# Patient Record
Sex: MALE | Race: WHITE | Employment: OTHER | URBAN - METROPOLITAN AREA
[De-identification: names, ages, dates, MRNs, and addresses within clinical notes are randomized per-mention and may not be internally consistent; named-entity substitution may affect disease eponyms.]

---

## 2021-03-23 ENCOUNTER — APPOINTMENT (OUTPATIENT)
Dept: CT IMAGING | Age: 71
DRG: 167 | End: 2021-03-23
Payer: MEDICARE

## 2021-03-23 ENCOUNTER — HOSPITAL ENCOUNTER (INPATIENT)
Age: 71
LOS: 2 days | Discharge: HOME | DRG: 167 | End: 2021-03-25
Attending: EMERGENCY MEDICINE | Admitting: INTERNAL MEDICINE
Payer: MEDICARE

## 2021-03-23 ENCOUNTER — APPOINTMENT (OUTPATIENT)
Dept: GENERAL RADIOLOGY | Age: 71
DRG: 167 | End: 2021-03-23
Payer: MEDICARE

## 2021-03-23 DIAGNOSIS — I82.412 ACUTE DEEP VEIN THROMBOSIS (DVT) OF FEMORAL VEIN OF LEFT LOWER EXTREMITY (HCC): ICD-10-CM

## 2021-03-23 DIAGNOSIS — Z99.81 HYPOXEMIA REQUIRING SUPPLEMENTAL OXYGEN: ICD-10-CM

## 2021-03-23 DIAGNOSIS — R06.09 DYSPNEA ON EXERTION: ICD-10-CM

## 2021-03-23 DIAGNOSIS — R55 NEAR SYNCOPE: ICD-10-CM

## 2021-03-23 DIAGNOSIS — I26.99 BILATERAL PULMONARY EMBOLISM (HCC): Primary | ICD-10-CM

## 2021-03-23 DIAGNOSIS — R09.02 HYPOXEMIA REQUIRING SUPPLEMENTAL OXYGEN: ICD-10-CM

## 2021-03-23 DIAGNOSIS — I26.99 ACUTE MASSIVE PULMONARY EMBOLISM (HCC): ICD-10-CM

## 2021-03-23 LAB
A/G RATIO: 1.5 (ref 1.1–2.2)
ABO/RH: NORMAL
ALBUMIN SERPL-MCNC: 4.1 G/DL (ref 3.4–5)
ALP BLD-CCNC: 58 U/L (ref 40–129)
ALT SERPL-CCNC: 10 U/L (ref 10–40)
ANION GAP SERPL CALCULATED.3IONS-SCNC: 13 MMOL/L (ref 3–16)
ANTIBODY SCREEN: NORMAL
APTT: 28.9 SEC (ref 24.2–36.2)
AST SERPL-CCNC: 20 U/L (ref 15–37)
BASE EXCESS VENOUS: -4.4 MMOL/L (ref -3–3)
BASOPHILS ABSOLUTE: 0.1 K/UL (ref 0–0.2)
BASOPHILS RELATIVE PERCENT: 0.6 %
BILIRUB SERPL-MCNC: 0.4 MG/DL (ref 0–1)
BUN BLDV-MCNC: 18 MG/DL (ref 7–20)
CALCIUM SERPL-MCNC: 9.8 MG/DL (ref 8.3–10.6)
CARBOXYHEMOGLOBIN: 1.7 % (ref 0–1.5)
CHLORIDE BLD-SCNC: 102 MMOL/L (ref 99–110)
CO2: 24 MMOL/L (ref 21–32)
CREAT SERPL-MCNC: 1.1 MG/DL (ref 0.8–1.3)
EOSINOPHILS ABSOLUTE: 0.2 K/UL (ref 0–0.6)
EOSINOPHILS RELATIVE PERCENT: 1.4 %
GFR AFRICAN AMERICAN: >60
GFR NON-AFRICAN AMERICAN: >60
GLOBULIN: 2.8 G/DL
GLUCOSE BLD-MCNC: 174 MG/DL (ref 70–99)
HCO3 VENOUS: 23.1 MMOL/L (ref 23–29)
HCT VFR BLD CALC: 41.3 % (ref 40.5–52.5)
HEMOGLOBIN: 13.7 G/DL (ref 13.5–17.5)
LYMPHOCYTES ABSOLUTE: 2.2 K/UL (ref 1–5.1)
LYMPHOCYTES RELATIVE PERCENT: 17 %
MCH RBC QN AUTO: 32.1 PG (ref 26–34)
MCHC RBC AUTO-ENTMCNC: 33.1 G/DL (ref 31–36)
MCV RBC AUTO: 97 FL (ref 80–100)
METHEMOGLOBIN VENOUS: 0.4 %
MONOCYTES ABSOLUTE: 0.9 K/UL (ref 0–1.3)
MONOCYTES RELATIVE PERCENT: 6.9 %
NEUTROPHILS ABSOLUTE: 9.6 K/UL (ref 1.7–7.7)
NEUTROPHILS RELATIVE PERCENT: 74.1 %
O2 CONTENT, VEN: 11 VOL %
O2 SAT, VEN: 53 %
O2 THERAPY: ABNORMAL
PCO2, VEN: 51.8 MMHG (ref 40–50)
PDW BLD-RTO: 13.5 % (ref 12.4–15.4)
PH VENOUS: 7.27 (ref 7.35–7.45)
PLATELET # BLD: 164 K/UL (ref 135–450)
PMV BLD AUTO: 9.7 FL (ref 5–10.5)
PO2, VEN: 30.2 MMHG (ref 25–40)
POTASSIUM REFLEX MAGNESIUM: 3.6 MMOL/L (ref 3.5–5.1)
PRO-BNP: 85 PG/ML (ref 0–124)
RBC # BLD: 4.26 M/UL (ref 4.2–5.9)
SARS-COV-2, NAAT: NOT DETECTED
SODIUM BLD-SCNC: 139 MMOL/L (ref 136–145)
TCO2 CALC VENOUS: 25 MMOL/L
TOTAL PROTEIN: 6.9 G/DL (ref 6.4–8.2)
TROPONIN: 0.08 NG/ML
WBC # BLD: 13 K/UL (ref 4–11)

## 2021-03-23 PROCEDURE — 86901 BLOOD TYPING SEROLOGIC RH(D): CPT

## 2021-03-23 PROCEDURE — 82803 BLOOD GASES ANY COMBINATION: CPT

## 2021-03-23 PROCEDURE — 86900 BLOOD TYPING SEROLOGIC ABO: CPT

## 2021-03-23 PROCEDURE — 85730 THROMBOPLASTIN TIME PARTIAL: CPT

## 2021-03-23 PROCEDURE — 71260 CT THORAX DX C+: CPT

## 2021-03-23 PROCEDURE — 86850 RBC ANTIBODY SCREEN: CPT

## 2021-03-23 PROCEDURE — 2700000000 HC OXYGEN THERAPY PER DAY

## 2021-03-23 PROCEDURE — 02FR3Z0 FRAGMENTATION OF LEFT PULMONARY ARTERY, PERCUTANEOUS APPROACH, ULTRASONIC: ICD-10-PCS | Performed by: SURGERY

## 2021-03-23 PROCEDURE — 99284 EMERGENCY DEPT VISIT MOD MDM: CPT

## 2021-03-23 PROCEDURE — 71045 X-RAY EXAM CHEST 1 VIEW: CPT

## 2021-03-23 PROCEDURE — 6360000004 HC RX CONTRAST MEDICATION: Performed by: NURSE PRACTITIONER

## 2021-03-23 PROCEDURE — 93005 ELECTROCARDIOGRAM TRACING: CPT | Performed by: NURSE PRACTITIONER

## 2021-03-23 PROCEDURE — 2500000003 HC RX 250 WO HCPCS: Performed by: NURSE PRACTITIONER

## 2021-03-23 PROCEDURE — 2000000000 HC ICU R&B

## 2021-03-23 PROCEDURE — 6360000002 HC RX W HCPCS

## 2021-03-23 PROCEDURE — 2580000003 HC RX 258: Performed by: NURSE PRACTITIONER

## 2021-03-23 PROCEDURE — 36415 COLL VENOUS BLD VENIPUNCTURE: CPT

## 2021-03-23 PROCEDURE — 80053 COMPREHEN METABOLIC PANEL: CPT

## 2021-03-23 PROCEDURE — 94660 CPAP INITIATION&MGMT: CPT

## 2021-03-23 PROCEDURE — 94761 N-INVAS EAR/PLS OXIMETRY MLT: CPT

## 2021-03-23 PROCEDURE — 02FQ3Z0 FRAGMENTATION OF RIGHT PULMONARY ARTERY, PERCUTANEOUS APPROACH, ULTRASONIC: ICD-10-PCS | Performed by: SURGERY

## 2021-03-23 PROCEDURE — 83880 ASSAY OF NATRIURETIC PEPTIDE: CPT

## 2021-03-23 PROCEDURE — 70450 CT HEAD/BRAIN W/O DYE: CPT

## 2021-03-23 PROCEDURE — 87635 SARS-COV-2 COVID-19 AMP PRB: CPT

## 2021-03-23 PROCEDURE — 84484 ASSAY OF TROPONIN QUANT: CPT

## 2021-03-23 PROCEDURE — 96374 THER/PROPH/DIAG INJ IV PUSH: CPT

## 2021-03-23 PROCEDURE — 96375 TX/PRO/DX INJ NEW DRUG ADDON: CPT

## 2021-03-23 PROCEDURE — 6360000002 HC RX W HCPCS: Performed by: NURSE PRACTITIONER

## 2021-03-23 PROCEDURE — 74177 CT ABD & PELVIS W/CONTRAST: CPT

## 2021-03-23 PROCEDURE — 85025 COMPLETE CBC W/AUTO DIFF WBC: CPT

## 2021-03-23 PROCEDURE — 3E04317 INTRODUCTION OF OTHER THROMBOLYTIC INTO CENTRAL VEIN, PERCUTANEOUS APPROACH: ICD-10-PCS | Performed by: SURGERY

## 2021-03-23 RX ORDER — HEPARIN SODIUM 1000 [USP'U]/ML
80 INJECTION, SOLUTION INTRAVENOUS; SUBCUTANEOUS ONCE
Status: COMPLETED | OUTPATIENT
Start: 2021-03-23 | End: 2021-03-23

## 2021-03-23 RX ORDER — ONDANSETRON 2 MG/ML
INJECTION INTRAMUSCULAR; INTRAVENOUS
Status: COMPLETED
Start: 2021-03-23 | End: 2021-03-23

## 2021-03-23 RX ORDER — 0.9 % SODIUM CHLORIDE 0.9 %
1000 INTRAVENOUS SOLUTION INTRAVENOUS ONCE
Status: COMPLETED | OUTPATIENT
Start: 2021-03-23 | End: 2021-03-23

## 2021-03-23 RX ORDER — METOCLOPRAMIDE HYDROCHLORIDE 5 MG/ML
10 INJECTION INTRAMUSCULAR; INTRAVENOUS ONCE
Status: COMPLETED | OUTPATIENT
Start: 2021-03-23 | End: 2021-03-23

## 2021-03-23 RX ORDER — HEPARIN SODIUM 1000 [USP'U]/ML
80 INJECTION, SOLUTION INTRAVENOUS; SUBCUTANEOUS PRN
Status: DISCONTINUED | OUTPATIENT
Start: 2021-03-17 | End: 2021-03-25

## 2021-03-23 RX ORDER — ONDANSETRON 2 MG/ML
4 INJECTION INTRAMUSCULAR; INTRAVENOUS ONCE
Status: COMPLETED | OUTPATIENT
Start: 2021-03-23 | End: 2021-03-23

## 2021-03-23 RX ORDER — HEPARIN SODIUM 10000 [USP'U]/100ML
18 INJECTION, SOLUTION INTRAVENOUS CONTINUOUS
Status: DISCONTINUED | OUTPATIENT
Start: 2021-03-23 | End: 2021-03-24

## 2021-03-23 RX ORDER — HEPARIN SODIUM 1000 [USP'U]/ML
40 INJECTION, SOLUTION INTRAVENOUS; SUBCUTANEOUS PRN
Status: DISCONTINUED | OUTPATIENT
Start: 2021-03-24 | End: 2021-03-25

## 2021-03-23 RX ADMIN — IOPAMIDOL 75 ML: 755 INJECTION, SOLUTION INTRAVENOUS at 20:51

## 2021-03-23 RX ADMIN — METOCLOPRAMIDE HYDROCHLORIDE 10 MG: 5 INJECTION INTRAMUSCULAR; INTRAVENOUS at 20:11

## 2021-03-23 RX ADMIN — ONDANSETRON 4 MG: 2 INJECTION INTRAMUSCULAR; INTRAVENOUS at 20:02

## 2021-03-23 RX ADMIN — SODIUM CHLORIDE 1000 ML: 9 INJECTION, SOLUTION INTRAVENOUS at 20:11

## 2021-03-23 RX ADMIN — HEPARIN SODIUM 6900 UNITS: 1000 INJECTION INTRAVENOUS; SUBCUTANEOUS at 21:56

## 2021-03-23 RX ADMIN — HEPARIN SODIUM 18 UNITS/KG/HR: 10000 INJECTION, SOLUTION INTRAVENOUS at 21:56

## 2021-03-23 ASSESSMENT — ENCOUNTER SYMPTOMS
NAUSEA: 0
ABDOMINAL PAIN: 0
SHORTNESS OF BREATH: 1
COLOR CHANGE: 0
WHEEZING: 0
DIARRHEA: 0
COUGH: 0
VOMITING: 0
BACK PAIN: 0

## 2021-03-23 NOTE — ED PROVIDER NOTES
I independently performed a history and physical on Miguel Austin. All diagnostic, treatment, and disposition decisions were made by myself in conjunction with the advanced practice provider.     -Mansoor Haines is a 79 y.o. male presents to ED for shortness of breath. Patient states he was walking in his barn when he felt acute shortness of breath and he had to sit down. This happened 1 more time before he was able to make it back to his house. He states that he did not want his wife putting away the horses so he went back to the barn and as he was walking back to the house, felt short of breath again and passed out. He was nauseous and had several episodes of emesis on route with EMS. He was given nausea medication at the time. Denies chest pain, fever, cough. Received his second covid vaccination today.   -Upon arrival patient was on a nonrebreather, afebrile, tachycardic at 105 bpm with oxygen saturation of 83%. He was transitioned to BiPAP, however had to take the BiPAP mask off due to nausea. After taking of the mass, he had several episodes of emesis and was given second dose of Zofran. He was put on nasal cannula however his oxygen only increased to mid 80s and therefore was transitioned to nonrebreather with improvement in the low 90s. -PE: Pale, tachycardic, tachypneic, CTAB/l, no w/r/r, abdomen soft, ND, NTTP, + BS x 4, no rigidity, rebound, guarding  -lab workup significant for: Elevated troponin of 0.08, leukocytosis of 13. VBG shows acidosis with pH of 7.26, increased CO2 of 51.  -Chest x-ray shows minimal left basilar atelectasis or airspace disease. -CT chest abdomen pelvis shows extensive bilateral pulmonary emboli with CT evidence suggestive of right heart strain. No acute intra-abdominal abnormality. Diverticulosis. Cholelithiasis. Partially calcified pulmonary nodule in the right upper lobe is most suggestive of granulomatous disease.   -CT head: No acute intracranial abnormality  -The Ekg interpreted by me shows  sinus tachycardia, swyi=[id number]   Axis is   Normal  QTc is  444  Intervals and Durations are unremarkable. ST Segments: depression in  lateral leads  No prior ekg for comparison   -Patient was started on high-dose heparin.  -Plan for admission for further workup and treatment for respiratory failure secondary to PE discussed with patient who is in agreement with plan and have no further questions/concerns  CRITICAL CARE NOTE:  Upon my evaluation, this patient had a high probability of imminent or life-threatening deterioration due to respiratory failure which required my direct attention, intervention, and personal management. I have personally provided 35 minutes of critical care time exclusive of time spent on separately billable procedures. Time includes review of laboratory data, analysis/ordering/implementation of complex plan, review of radiology results, discussion with consultants, discussion with family, and monitoring for potential decompensation. Interventions were performed as documented above. For further details of Sonali Austin's emergency department encounter, please see [facility]'s documentation.         Gudelia Barragan MD  03/23/21 4940

## 2021-03-23 NOTE — ED NOTES
Bed: 04  Expected date:   Expected time:   Means of arrival:   Comments:  Albert Dumont RN  03/23/21 1936

## 2021-03-23 NOTE — ED PROVIDER NOTES
**ADVANCED PRACTICE PROVIDER, I HAVE EVALUATED THIS [facility]  ED  EMERGENCY DEPARTMENT ENCOUNTER      Pt Name: Gregg Au  CDK:[id number]  [place] 1950  Date of evaluation: 3/23/2021  Provider: DEV Banks CNP      Chief Complaint:    Chief Complaint   Patient presents with    Shortness of Breath     Patient comes into ED via [facility] with c/o sudden onset of shortness of breath and vomiting  and had second round of COVID vaccine today. EMS states upon arrival his lips were blue in collar and placed on nonrebreather. Patient is alert and oriented upon arrival to ED and denies any significant medical history. Nursing Notes, Past Medical Hx, Past Surgical Hx, Social Hx, Allergies, and Family Hx were all reviewed and agreed with or any disagreements were addressed in the HPI.    HPI:  (Location, Duration, Timing, Severity, Quality, Assoc Sx, Context, Modifying factors)  This is a  79 y.o. male who presents to the emergency department from EMS with sudden onset of shortness of breath, patient states that just prior to ED arrival he was walking to his worst morning and all of a sudden he became short of breath. States that the came on suddenly, he did have a second Covid vaccine, states that he became very weak, went to the ground, 911 was called. Patient states he just feels like he cannot catch his breath. He denies any cough, congestion, chest pain pleuritic chest pain associated with the shortness of breath. When EMS arrived they states his lips were blue, placed on nonrebreather mask as his pulse ox was in the mid 70s. He has no history of chronic lung disease, no history of really any medical problems, has not really seen a physician. He denies any headache neck pain or neck stiffness. He reports nausea, has vomited 4 times in route with EMS.   He denies any alcohol or drug use, no new medications, no additional aggravating relieving factors, no pain on exam.  He presents awake, alert, pale, clammy, short of breath and hypoxic. PastMedical/Surgical History:  History reviewed. No pertinent past medical history. History reviewed. No pertinent surgical history. Medications:  Previous Medications    No medications on file         Review of Systems:  Review of Systems   Constitutional: Negative for chills and fever. HENT: Negative for congestion. Respiratory: Positive for shortness of breath. Negative for cough and wheezing. Patient complains of sudden onset of shortness of breath, patient states that just prior to ED arrival he was walking to his worst morning and all of a sudden he became short of breath. States that the came on suddenly, he did have a second Covid vaccine, states that he became very weak, went to the ground   Cardiovascular: Negative for chest pain. Denies any recent illnesses, fever, chills, cough or congestion, no chest pain or pleuritic chest pain associated with his shortness of breath. Gastrointestinal: Negative for abdominal pain, diarrhea, nausea and vomiting. Genitourinary: Negative for difficulty urinating, dysuria and frequency. Musculoskeletal: Negative for back pain. Skin: Negative for color change. Neurological: Negative for weakness, numbness and headaches. He states that he did get lightheaded, walking back to the barn. Denies any loss of consciousness, headache or dizziness on my exam     Positives and Pertinent negatives as per HPI. Except as noted above in the ROS, problem specific ROS was completed and is negative. Physical Exam:  Physical Exam  Vitals signs and nursing note reviewed. Constitutional:       Appearance: He is well-developed. He is not diaphoretic. HENT:      Head: Normocephalic. Right Ear: External ear normal.      Left Ear: External ear normal.   Eyes:      General: No scleral icterus. Right eye: No discharge.          Left eye: No discharge. Neck:      Musculoskeletal: Normal range of motion and neck supple. Cardiovascular:      Rate and Rhythm: Tachycardia present. Comments: S1 and 2, peripheral pulses are 2+, no edema observed, sinus tach on the monitor at 108 bpm. Bedside exam.  Pulmonary:      Effort: Pulmonary effort is normal. No respiratory distress. Comments: Airway patent with symmetric rise and fall of chest, clear anteriorly, diminished posteriorly, patient is slightly dyspneic during conversation however he is not really tachypneic, he is breathing 20 breaths/min however his pulse ox is only 86% on 100% nonrebreather. Abdominal:      Palpations: Abdomen is soft. Musculoskeletal: Normal range of motion. Skin:     General: Skin is warm. Capillary Refill: Capillary refill takes less than 2 seconds. Coloration: Skin is not pale. Neurological:      General: No focal deficit present. Mental Status: He is alert and oriented to person, place, and time. GCS: GCS eye subscore is 4. GCS verbal subscore is 5. GCS motor subscore is 6.    Psychiatric:         Behavior: Behavior normal.         MEDICAL DECISION MAKING    Vitals:    Vitals:    03/23/21 1949 03/23/21 2014 03/23/21 2120 03/23/21 2206   BP:  111/77 117/75 99/70   Pulse:  110 107 112   Resp: 14 15 17 16   Temp:       SpO2:  91% (!) 82% (!) 89%   Weight:       Height:           LABS:  Labs Reviewed   CBC WITH AUTO DIFFERENTIAL - Abnormal; Notable for the following components:       Result Value    WBC 13.0 (*)     Neutrophils Absolute 9.6 (*)     All other components within normal limits    Narrative:     Performed at:  [facility]) - [address]   Phone [phone]   COMPREHENSIVE METABOLIC PANEL W/ REFLEX TO MG FOR LOW K - Abnormal; Notable for the following components:    Glucose 174 (*)     All other components within normal limits    Narrative:     Performed at:  [facility]) - [address], [facility]   Phone [phone]   TROPONIN - Abnormal; Notable for the following components:    Troponin 0.08 (*)     All other components within normal limits    Narrative:     Performed at:  [place] [address] [facility]   Phone [phone]   BLOOD GAS, VENOUS - Abnormal; Notable for the following components:    pH, [place] 7.267 (*)     pCO2, [place] 51.8 (*)     Base Excess, [place] -4.4 (*)     Carboxyhemoglobin 1.7 (*)     All other components within normal limits    Narrative:     Performed at:  [place] [address] [facility]   Phone [phone]    Narrative:     Performed at:  [place] [address] [facility]   Phone [phone]   URINE RT REFLEX TO CULTURE   APTT   APTT   TYPE AND SCREEN    Narrative:     Performed at:  [address], [facility]   Phone  of labs reviewed and werenegative at this time or not returned at the time of this note. RADIOLOGY:   Non-plain film images such as CT, Ultrasound and MRI are read by the radiologist. Emelyn SEPULVEDA APRN - CNP have directly visualized the radiologic plain film image(s) with the below findings:        Interpretation per the Radiologist below, if available at the time of this note:    CT HEAD WO CONTRAST   Final Result   No acute intracranial abnormality. CT CHEST PULMONARY EMBOLISM W CONTRAST   Final Result   1. Extensive bilateral pulmonary emboli with CT evidence suggestive of right   heart strain. 2. No acute intra-abdominal abnormality. 3. Diverticulosis. 4. Cholelithiasis. 5. Partially calcified pulmonary nodule in the right upper lobe is most   suggestive of granulomatous disease.    Findings were discussed with Dr. Usha Anderson on 03/23/2021 at 9:20 p.m. Delsa Severance CT ABDOMEN PELVIS W IV CONTRAST Additional Contrast? None   Final Result   1. Extensive bilateral pulmonary emboli with CT evidence suggestive of right   heart strain. 2. No acute intra-abdominal abnormality. 3. Diverticulosis. 4. Cholelithiasis. 5. Partially calcified pulmonary nodule in the right upper lobe is most   suggestive of granulomatous disease. Findings were discussed with Dr. Usha Anderson on 03/23/2021 at 9:20 p.m. Delsa Severance XR CHEST PORTABLE   Final Result   Minimal left basilar atelectasis or airspace disease. 2.4 cm right perihilar nodule is likely partially calcified though is   incompletely characterized in this chest radiograph. Comparison with any   outside prior would be helpful to establish stability. Otherwise, in the   nonacute setting, chest CT would be recommended for more complete evaluation. MEDICAL DECISION MAKING / ED COURSE:    Because of high probability of sudden clinical deterioration of the patient's condition and risk of further deterioration, critical care time required my full attention to the patient's condition; which included chart data review, documentation, medication ordering, reviewing the patient's old records, reevaluation patient's cardiac, pulmonary and neurological status. Reevaluation of vital signs. Consultations with ED attending and admitting physician. Ordering, interpreting reviewing diagnostic testing. Therefore a critical care time was 60 minutes of direct attention to the patient's condition did not include time spent on procedures.     PROCEDURES:   Procedures    None    Patient was given:  Medications   heparin (porcine) injection 6,900 Units (has no administration in time range)   heparin (porcine) injection 3,450 Units (has no administration in time range)   heparin 25,000 unit in sodium chloride 0.45% 250 mL (premix) infusion (18 Units/kg/hr × 86.2 kg Intravenous New Bag 3/23/21 2156)   0.9 % sodium chloride bolus (0 mLs Intravenous Stopped 3/23/21 2208)   ondansetron (ZOFRAN) injection 4 mg (4 mg Intravenous Given 3/23/21 2002)   metoclopramide (REGLAN) injection 10 mg (10 mg Intravenous Given 3/23/21 2011)   iopamidol (ISOVUE-370) 76 % injection 75 mL (75 mLs Intravenous Given 3/23/21 2051)   heparin (porcine) injection 6,900 Units (6,900 Units Intravenous Given 3/23/21 2156)   patient presents via EMS with sudden onset of shortness of breath, patient states that just prior to ED arrival he was walking to his worst morning and all of a sudden he became short of breath. States that the came on suddenly, he did have a second Covid vaccine, states that he became very weak, went to the ground, 911 was called. patient is slightly dyspneic during conversation however he is not really tachypneic, he is breathing 20 breaths/min however his pulse ox is only 86% on 100% nonrebreather. After evaluation and examination the patient IV access, IV fluids, blood work, analysis, chest x-ray, EKG, CT scan of the chest abdomen pelvis were ordered. EKG shows sinus tachycardia with T wave inversion, please see Dr. Cristian Avalos EKG interpretation note. Patient had near syncopal episode, he was ordered a CT chest. CBC shows a leukocytosis with a white count 13,000, no acute anemia. Metabolic panel shows no electrolyte disturbances or renal insufficiency. Liver functions are normal. Troponin is 0.08. BNP is 85. Blood gas shows acidosis with a pH of 7.267, PCO2 of 51.8, carboxyhemoglobin is 1.7 otherwise the bicarb and PO2 are normal, CT the head shows no acute intracranial abnormality. At 2118 spoke with radiology on call, she informs me patient has multiple PEs with heart strain, patient was ordered high-dose heparin. Patient has been placed on high flow nasal cannula O2 at 60%, saturations are 88% with a high flow O2.  However he remains awake, alert and talking with the nursing staff and myself. However because of these acute findings I paged the hospitalist on-call via [email or web address] for admission. At 2220 I spoke with Dr. Pang Spearing face-to-face in the emergency department regards to the patient, he agreed to set the patient for admission, he is going to [facility] to discuss EKOS treatment. The patient tolerated their visit well. I evaluated the patient. The physician was available for consultation as needed. The patient and / or the family were informed of the results of any tests, a time was given to answer questions, a plan was proposed and they agreed with plan. However, patient will be admitted to hospital for further evaluation management of care. CLINICAL IMPRESSION:  1. Bilateral pulmonary embolism ([address]    2. Hypoxemia requiring supplemental oxygen    3. Near syncope    4. Dyspnea on exertion        DISPOSITION Decision To Admit 03/23/2021 10:06:59 PM      PATIENT REFERRED TO:  No follow-up provider specified. DISCHARGE MEDICATIONS:  New Prescriptions    No medications on file       DISCONTINUED MEDICATIONS:  Discontinued Medications    FAMOTIDINE (PEPCID) 40 MG TABLET    Take 1 tablet by mouth daily for 14 days.               (Please note the MDM and HPI sections of this note were completed with a voice recognition program.  Efforts were made to edit the dictations but occasionally words are mis-transcribed.)    Electronically signed, DEV Perez CNP,          DEV Perez CNP  03/23/21 9245

## 2021-03-23 NOTE — PROGRESS NOTES
03/23/21 1949   NIV Type   $NIV $Daily Charge   NIV Started/Stopped On   Equipment Type v60   Mode CPAP   Mask Type Full face mask   Mask Size Large   Settings/Measurements   CPAP/EPAP 13 cmH2O   Resp 14   FiO2  100 %   Vt Exhaled 875 mL   Minute Volume 12.2 Liters   Mask Leak (lpm) 130 lpm  (pt has beard )   Comfort Level Good   Using Accessory Muscles No   SpO2 94   Breath Sounds   Right Upper Lobe Clear   Right Middle Lobe Diminished   Right Lower Lobe Diminished   Left Upper Lobe Clear   Left Lower Lobe Diminished   Alarm Settings   Alarms On Y   Press Low Alarm 6 cmH2O   High Pressure Alarm 30 cmH2O   Delay Alarm 20 sec(s)   Resp Rate Low Alarm 6   High Respiratory Rate 40 br/min

## 2021-03-24 LAB
ALBUMIN SERPL-MCNC: 3.8 G/DL (ref 3.4–5)
ALP BLD-CCNC: 51 U/L (ref 40–129)
ALT SERPL-CCNC: 6 U/L (ref 10–40)
ANION GAP SERPL CALCULATED.3IONS-SCNC: 9 MMOL/L (ref 3–16)
APTT: 130.8 SEC (ref 24.2–36.2)
APTT: 155 SEC (ref 24.2–36.2)
APTT: 34.6 SEC (ref 24.2–36.2)
APTT: 44.2 SEC (ref 24.2–36.2)
APTT: 79.2 SEC (ref 24.2–36.2)
AST SERPL-CCNC: 22 U/L (ref 15–37)
BASOPHILS ABSOLUTE: 0 K/UL (ref 0–0.2)
BASOPHILS RELATIVE PERCENT: 0.3 %
BILIRUB SERPL-MCNC: 0.4 MG/DL (ref 0–1)
BILIRUBIN DIRECT: <0.2 MG/DL (ref 0–0.3)
BILIRUBIN URINE: ABNORMAL
BILIRUBIN, INDIRECT: ABNORMAL MG/DL (ref 0–1)
BLOOD, URINE: ABNORMAL
BUN BLDV-MCNC: 17 MG/DL (ref 7–20)
CALCIUM SERPL-MCNC: 9.1 MG/DL (ref 8.3–10.6)
CHLORIDE BLD-SCNC: 108 MMOL/L (ref 99–110)
CLARITY: CLEAR
CO2: 24 MMOL/L (ref 21–32)
COLOR: YELLOW
CREAT SERPL-MCNC: 0.9 MG/DL (ref 0.8–1.3)
EKG ATRIAL RATE: 104 BPM
EKG DIAGNOSIS: NORMAL
EKG P AXIS: 55 DEGREES
EKG P-R INTERVAL: 174 MS
EKG Q-T INTERVAL: 344 MS
EKG QRS DURATION: 126 MS
EKG QTC CALCULATION (BAZETT): 452 MS
EKG R AXIS: 60 DEGREES
EKG T AXIS: -64 DEGREES
EKG VENTRICULAR RATE: 104 BPM
EOSINOPHILS ABSOLUTE: 0 K/UL (ref 0–0.6)
EOSINOPHILS RELATIVE PERCENT: 0.1 %
EPITHELIAL CELLS, UA: NORMAL /HPF (ref 0–5)
FIBRINOGEN: 272 MG/DL (ref 200–397)
FIBRINOGEN: 311 MG/DL (ref 200–397)
FIBRINOGEN: 311 MG/DL (ref 200–397)
GFR AFRICAN AMERICAN: >60
GFR NON-AFRICAN AMERICAN: >60
GLUCOSE BLD-MCNC: 123 MG/DL (ref 70–99)
GLUCOSE URINE: NEGATIVE MG/DL
HCT VFR BLD CALC: 36.8 % (ref 40.5–52.5)
HCT VFR BLD CALC: 38.5 % (ref 40.5–52.5)
HCT VFR BLD CALC: 39.8 % (ref 40.5–52.5)
HEMOGLOBIN: 12.5 G/DL (ref 13.5–17.5)
HEMOGLOBIN: 13.1 G/DL (ref 13.5–17.5)
HEMOGLOBIN: 13.3 G/DL (ref 13.5–17.5)
KETONES, URINE: NEGATIVE MG/DL
LEUKOCYTE ESTERASE, URINE: NEGATIVE
LYMPHOCYTES ABSOLUTE: 1.4 K/UL (ref 1–5.1)
LYMPHOCYTES RELATIVE PERCENT: 13.2 %
MCH RBC QN AUTO: 32.4 PG (ref 26–34)
MCH RBC QN AUTO: 32.9 PG (ref 26–34)
MCH RBC QN AUTO: 33.3 PG (ref 26–34)
MCHC RBC AUTO-ENTMCNC: 33 G/DL (ref 31–36)
MCHC RBC AUTO-ENTMCNC: 34 G/DL (ref 31–36)
MCHC RBC AUTO-ENTMCNC: 34.6 G/DL (ref 31–36)
MCV RBC AUTO: 96.3 FL (ref 80–100)
MCV RBC AUTO: 96.8 FL (ref 80–100)
MCV RBC AUTO: 98.2 FL (ref 80–100)
MICROSCOPIC EXAMINATION: YES
MONOCYTES ABSOLUTE: 0.9 K/UL (ref 0–1.3)
MONOCYTES RELATIVE PERCENT: 8.1 %
NEUTROPHILS ABSOLUTE: 8.3 K/UL (ref 1.7–7.7)
NEUTROPHILS RELATIVE PERCENT: 78.3 %
NITRITE, URINE: NEGATIVE
PDW BLD-RTO: 13.4 % (ref 12.4–15.4)
PDW BLD-RTO: 13.8 % (ref 12.4–15.4)
PDW BLD-RTO: 13.8 % (ref 12.4–15.4)
PH UA: 5.5 (ref 5–8)
PLATELET # BLD: 122 K/UL (ref 135–450)
PLATELET # BLD: 137 K/UL (ref 135–450)
PLATELET # BLD: 155 K/UL (ref 135–450)
PMV BLD AUTO: 9.2 FL (ref 5–10.5)
PMV BLD AUTO: 9.4 FL (ref 5–10.5)
PMV BLD AUTO: 9.9 FL (ref 5–10.5)
POTASSIUM REFLEX MAGNESIUM: 4.7 MMOL/L (ref 3.5–5.1)
PROSTATE SPECIFIC ANTIGEN: 2.04 NG/ML (ref 0–4)
PROTEIN UA: NEGATIVE MG/DL
RBC # BLD: 3.81 M/UL (ref 4.2–5.9)
RBC # BLD: 4 M/UL (ref 4.2–5.9)
RBC # BLD: 4.05 M/UL (ref 4.2–5.9)
RBC UA: NORMAL /HPF (ref 0–4)
SODIUM BLD-SCNC: 141 MMOL/L (ref 136–145)
SPECIFIC GRAVITY UA: >=1.03 (ref 1–1.03)
TOTAL PROTEIN: 6.5 G/DL (ref 6.4–8.2)
TROPONIN: 0.24 NG/ML
URINE REFLEX TO CULTURE: ABNORMAL
URINE TYPE: ABNORMAL
UROBILINOGEN, URINE: 0.2 E.U./DL
WBC # BLD: 10.6 K/UL (ref 4–11)
WBC # BLD: 8.7 K/UL (ref 4–11)
WBC # BLD: 8.9 K/UL (ref 4–11)
WBC UA: NORMAL /HPF (ref 0–5)

## 2021-03-24 PROCEDURE — 36415 COLL VENOUS BLD VENIPUNCTURE: CPT

## 2021-03-24 PROCEDURE — 2580000003 HC RX 258: Performed by: SURGERY

## 2021-03-24 PROCEDURE — 2000000000 HC ICU R&B

## 2021-03-24 PROCEDURE — 85730 THROMBOPLASTIN TIME PARTIAL: CPT

## 2021-03-24 PROCEDURE — 36014 PLACE CATHETER IN ARTERY: CPT

## 2021-03-24 PROCEDURE — 6360000002 HC RX W HCPCS

## 2021-03-24 PROCEDURE — 37211 THROMBOLYTIC ART THERAPY: CPT

## 2021-03-24 PROCEDURE — C1751 CATH, INF, PER/CENT/MIDLINE: HCPCS

## 2021-03-24 PROCEDURE — 37211 THROMBOLYTIC ART THERAPY: CPT | Performed by: SURGERY

## 2021-03-24 PROCEDURE — C1894 INTRO/SHEATH, NON-LASER: HCPCS

## 2021-03-24 PROCEDURE — 76937 US GUIDE VASCULAR ACCESS: CPT | Performed by: SURGERY

## 2021-03-24 PROCEDURE — C1769 GUIDE WIRE: HCPCS

## 2021-03-24 PROCEDURE — 2500000003 HC RX 250 WO HCPCS

## 2021-03-24 PROCEDURE — 2709999900 HC NON-CHARGEABLE SUPPLY

## 2021-03-24 PROCEDURE — 84153 ASSAY OF PSA TOTAL: CPT

## 2021-03-24 PROCEDURE — 94761 N-INVAS EAR/PLS OXIMETRY MLT: CPT

## 2021-03-24 PROCEDURE — 6360000002 HC RX W HCPCS: Performed by: SURGERY

## 2021-03-24 PROCEDURE — 85027 COMPLETE CBC AUTOMATED: CPT

## 2021-03-24 PROCEDURE — C1887 CATHETER, GUIDING: HCPCS

## 2021-03-24 PROCEDURE — 99222 1ST HOSP IP/OBS MODERATE 55: CPT | Performed by: SURGERY

## 2021-03-24 PROCEDURE — 99152 MOD SED SAME PHYS/QHP 5/>YRS: CPT | Performed by: SURGERY

## 2021-03-24 PROCEDURE — 93010 ELECTROCARDIOGRAM REPORT: CPT | Performed by: INTERNAL MEDICINE

## 2021-03-24 PROCEDURE — 85025 COMPLETE CBC W/AUTO DIFF WBC: CPT

## 2021-03-24 PROCEDURE — 84484 ASSAY OF TROPONIN QUANT: CPT

## 2021-03-24 PROCEDURE — 80048 BASIC METABOLIC PNL TOTAL CA: CPT

## 2021-03-24 PROCEDURE — 2500000003 HC RX 250 WO HCPCS: Performed by: INTERNAL MEDICINE

## 2021-03-24 PROCEDURE — 6370000000 HC RX 637 (ALT 250 FOR IP): Performed by: INTERNAL MEDICINE

## 2021-03-24 PROCEDURE — 80076 HEPATIC FUNCTION PANEL: CPT

## 2021-03-24 PROCEDURE — 81001 URINALYSIS AUTO W/SCOPE: CPT

## 2021-03-24 PROCEDURE — 2580000003 HC RX 258: Performed by: INTERNAL MEDICINE

## 2021-03-24 PROCEDURE — 2700000000 HC OXYGEN THERAPY PER DAY

## 2021-03-24 PROCEDURE — 2500000003 HC RX 250 WO HCPCS: Performed by: SURGERY

## 2021-03-24 PROCEDURE — 36015 PLACE CATHETER IN ARTERY: CPT | Performed by: SURGERY

## 2021-03-24 PROCEDURE — 85384 FIBRINOGEN ACTIVITY: CPT

## 2021-03-24 RX ORDER — SODIUM CHLORIDE 0.9 % (FLUSH) 0.9 %
10 SYRINGE (ML) INJECTION EVERY 12 HOURS SCHEDULED
Status: DISCONTINUED | OUTPATIENT
Start: 2021-03-24 | End: 2021-03-25 | Stop reason: HOSPADM

## 2021-03-24 RX ORDER — HEPARIN SODIUM 10000 [USP'U]/100ML
1050 INJECTION, SOLUTION INTRAVENOUS CONTINUOUS
Status: DISCONTINUED | OUTPATIENT
Start: 2021-03-25 | End: 2021-03-25

## 2021-03-24 RX ORDER — SODIUM CHLORIDE 9 MG/ML
INJECTION, SOLUTION INTRAVENOUS CONTINUOUS PRN
Status: DISCONTINUED | OUTPATIENT
Start: 2021-03-24 | End: 2021-03-25 | Stop reason: HOSPADM

## 2021-03-24 RX ORDER — ACETAMINOPHEN 650 MG/1
650 SUPPOSITORY RECTAL EVERY 6 HOURS PRN
Status: DISCONTINUED | OUTPATIENT
Start: 2021-03-24 | End: 2021-03-25 | Stop reason: HOSPADM

## 2021-03-24 RX ORDER — FENTANYL CITRATE 50 UG/ML
INJECTION, SOLUTION INTRAMUSCULAR; INTRAVENOUS
Status: COMPLETED | OUTPATIENT
Start: 2021-03-24 | End: 2021-03-24

## 2021-03-24 RX ORDER — MIDAZOLAM HYDROCHLORIDE 5 MG/ML
INJECTION INTRAMUSCULAR; INTRAVENOUS
Status: COMPLETED | OUTPATIENT
Start: 2021-03-24 | End: 2021-03-24

## 2021-03-24 RX ORDER — HEPARIN SODIUM 10000 [USP'U]/100ML
250 INJECTION, SOLUTION INTRAVENOUS CONTINUOUS
Status: ACTIVE | OUTPATIENT
Start: 2021-03-24 | End: 2021-03-24

## 2021-03-24 RX ORDER — SODIUM CHLORIDE 0.9 % (FLUSH) 0.9 %
10 SYRINGE (ML) INJECTION EVERY 12 HOURS SCHEDULED
Status: DISCONTINUED | OUTPATIENT
Start: 2021-03-24 | End: 2021-03-24 | Stop reason: SDUPTHER

## 2021-03-24 RX ORDER — PROMETHAZINE HYDROCHLORIDE 25 MG/1
12.5 TABLET ORAL EVERY 6 HOURS PRN
Status: DISCONTINUED | OUTPATIENT
Start: 2021-03-24 | End: 2021-03-25 | Stop reason: HOSPADM

## 2021-03-24 RX ORDER — HEPARIN SODIUM 10000 [USP'U]/100ML
1050 INJECTION, SOLUTION INTRAVENOUS CONTINUOUS
Status: DISCONTINUED | OUTPATIENT
Start: 2021-03-24 | End: 2021-03-24

## 2021-03-24 RX ORDER — SODIUM CHLORIDE 9 MG/ML
INJECTION, SOLUTION INTRAVENOUS CONTINUOUS
Status: DISCONTINUED | OUTPATIENT
Start: 2021-03-24 | End: 2021-03-25

## 2021-03-24 RX ORDER — SODIUM CHLORIDE 0.9 % (FLUSH) 0.9 %
10 SYRINGE (ML) INJECTION PRN
Status: DISCONTINUED | OUTPATIENT
Start: 2021-03-24 | End: 2021-03-24 | Stop reason: SDUPTHER

## 2021-03-24 RX ORDER — ONDANSETRON 2 MG/ML
4 INJECTION INTRAMUSCULAR; INTRAVENOUS EVERY 6 HOURS PRN
Status: DISCONTINUED | OUTPATIENT
Start: 2021-03-24 | End: 2021-03-25 | Stop reason: HOSPADM

## 2021-03-24 RX ORDER — POLYETHYLENE GLYCOL 3350 17 G/17G
17 POWDER, FOR SOLUTION ORAL DAILY PRN
Status: DISCONTINUED | OUTPATIENT
Start: 2021-03-24 | End: 2021-03-25 | Stop reason: HOSPADM

## 2021-03-24 RX ORDER — SODIUM CHLORIDE 0.9 % (FLUSH) 0.9 %
10 SYRINGE (ML) INJECTION PRN
Status: DISCONTINUED | OUTPATIENT
Start: 2021-03-24 | End: 2021-03-25 | Stop reason: HOSPADM

## 2021-03-24 RX ORDER — PROMETHAZINE HYDROCHLORIDE 25 MG/1
12.5 TABLET ORAL EVERY 6 HOURS PRN
Status: DISCONTINUED | OUTPATIENT
Start: 2021-03-24 | End: 2021-03-25

## 2021-03-24 RX ORDER — ACETAMINOPHEN 325 MG/1
650 TABLET ORAL EVERY 6 HOURS PRN
Status: DISCONTINUED | OUTPATIENT
Start: 2021-03-24 | End: 2021-03-25 | Stop reason: HOSPADM

## 2021-03-24 RX ORDER — ONDANSETRON 2 MG/ML
4 INJECTION INTRAMUSCULAR; INTRAVENOUS EVERY 6 HOURS PRN
Status: DISCONTINUED | OUTPATIENT
Start: 2021-03-24 | End: 2021-03-25

## 2021-03-24 RX ORDER — SODIUM CHLORIDE 9 MG/ML
INJECTION, SOLUTION INTRAVENOUS CONTINUOUS
Status: DISCONTINUED | OUTPATIENT
Start: 2021-03-24 | End: 2021-03-24

## 2021-03-24 RX ADMIN — MUPIROCIN: 20 OINTMENT TOPICAL at 08:47

## 2021-03-24 RX ADMIN — HEPARIN SODIUM 250 UNITS/HR: 10000 INJECTION, SOLUTION INTRAVENOUS at 15:34

## 2021-03-24 RX ADMIN — SODIUM CHLORIDE: 9 INJECTION, SOLUTION INTRAVENOUS at 15:33

## 2021-03-24 RX ADMIN — HEPARIN SODIUM 1050 UNITS/HR: 10000 INJECTION, SOLUTION INTRAVENOUS at 06:30

## 2021-03-24 RX ADMIN — HEPARIN SODIUM 250 UNITS/HR: 10000 INJECTION, SOLUTION INTRAVENOUS at 15:35

## 2021-03-24 RX ADMIN — SODIUM CHLORIDE 100 ML/HR: 9 INJECTION, SOLUTION INTRAVENOUS at 01:37

## 2021-03-24 RX ADMIN — ALTEPLASE 1 MG/HR: 2.2 INJECTION, POWDER, LYOPHILIZED, FOR SOLUTION INTRAVENOUS at 15:35

## 2021-03-24 RX ADMIN — MIDAZOLAM HYDROCHLORIDE 2 MG: 5 INJECTION INTRAMUSCULAR; INTRAVENOUS at 14:29

## 2021-03-24 RX ADMIN — FENTANYL CITRATE 50 MCG: 50 INJECTION, SOLUTION INTRAMUSCULAR; INTRAVENOUS at 14:29

## 2021-03-24 ASSESSMENT — PAIN SCALES - GENERAL
PAINLEVEL_OUTOF10: 0

## 2021-03-24 NOTE — H&P
Hospital Medicine History & Physical      PCP: Iva Hewitt MD    Date of Admission: 3/23/2021    Date of Service: Pt seen/examined on 3/23/2021 and Admitted to Inpatient with expected LOS greater than two midnights due to medical therapy. Chief Complaint:  Shortness of breath      History Of Present Illness:    79 y.o. male who presented to [facility] with above complaints  Patient without significant past medical history, presented to the ED today with complaints of shortness of breath x1 day. Patient reports sudden onset exertional shortness of breath, occurring multiple times today while he was out working in the barn taking care of his horses. He had multiple episodes where with exertion he became very short of breath, nauseated, diaphoretic, lightheaded. He does report 1 episode of possible syncopal attack while he was on the deck. He denied any chest pain. No cough. No subjective fevers chills or rigors. He recently got his second dose of Covid vaccine. Wife reported that when she witnessed his episode of shortness of breath, he was done on the ground, with blue lips and looked pale. EMS was called who reported his sats were in the 70s, placed him on a nonrebreather. Patient denied any recent surgeries, prolonged immobilization, testosterone use, clotting disorders in the family, denied any recent trauma, denies smoking, denies previous blood clots. Patient reports he is very active and is constantly working out in his barn taking care of all his horses. Past Medical History:      History reviewed. No pertinent past medical history. Past Surgical History:      History reviewed. No pertinent surgical history. Medications Prior to Admission:      Prior to Admission medications    Not on File       Allergies:  Aspirin    Social History:      The patient currently lives at home with spouse    TOBACCO:   reports that he has never smoked.  He has never used smokeless tobacco.  ETOH:   reports no history of alcohol use. E-Cigarettes/Vaping Use     Questions Responses    E-Cigarette/Vaping Use Never User    Start Date     Passive Exposure     Quit Date     Counseling Given     Comments             Family History:  Reviewed in detail and negative for DM, CAD, Cancer, CVA. REVIEW OF SYSTEMS:   Pertinent positives as noted in the HPI. All other systems reviewed and negative. PHYSICAL EXAM PERFORMED:    BP 97/76   Pulse 100   Temp 96.8 °F (36 °C)   Resp 17   Ht 5' 10\" (1.778 m)   Wt 190 lb (86.2 kg)   SpO2 (!) 89%   BMI 27.26 kg/m²     General appearance:  No apparent distress, appears stated age and cooperative. HEENT:  Normal cephalic, atraumatic without obvious deformity. Pupils equal, round, and reactive to light. Extra ocular muscles intact. Conjunctivae/corneas clear. Neck: Supple, with full range of motion. No jugular venous distention. Trachea midline. Respiratory:  Normal respiratory effort. Clear to auscultation, bilaterally without Rales/Wheezes/Rhonchi. Cardiovascular: Tachycardic, regular rhythm with normal S1/S2 without murmurs, rubs or gallops. Abdomen: Soft, non-tender, non-distended with normal bowel sounds. Musculoskeletal:  No clubbing, cyanosis or edema bilaterally. Full range of motion without deformity. Calf girth discrepancy , left mid calf girth 41cm, right mid calf girth 38cm  Skin: Skin color, texture, turgor normal.  No rashes or lesions. Neurologic:  Neurovascularly intact without any focal sensory/motor deficits.  Cranial nerves: II-XII intact, grossly non-focal.  Psychiatric:  Alert and oriented, thought content appropriate, normal insight  Capillary Refill: Brisk,< 3 seconds   Peripheral Pulses: +2 palpable, equal bilaterally       Labs:     Recent Labs     03/23/21 1944   WBC 13.0*   HGB 13.7   HCT 41.3        Recent Labs     03/23/21 1944      K 3.6      CO2 24   BUN 18   CREATININE 1.1   CALCIUM 9.8 Recent Labs     03/23/21 1944   AST 20   ALT 10   BILITOT 0.4   ALKPHOS 58     No results for input(s): INR in the last 72 hours. Recent Labs     03/23/21 1944   TROPONINI 0.08*       Urinalysis:    No results found for: Bryson Cameron, [address], BACTERIA, RBCUA, BLOODU, [place], GLUCOSEU    Radiology:     I personally reviewed the CT head, CT chest and CT abdomen pelvis and also reviewed all the radiologist interpretation      EKG:  I have reviewed the EKG with the following interpretation: Sinus tachycardia, S1 wave T WI in lead III and Q-wave in lead III seen typical in PE, RBBB, these are new compared to prior EKG from 2013    [address]   Final Result   No acute intracranial abnormality. CT CHEST PULMONARY EMBOLISM W CONTRAST   Final Result   1. Extensive bilateral pulmonary emboli with CT evidence suggestive of right   heart strain. RV-LV ratio: 1.6   2. No acute intra-abdominal abnormality. 3. Diverticulosis. 4. Cholelithiasis. 5. Partially calcified pulmonary nodule in the right upper lobe is most   suggestive of granulomatous disease. Findings were discussed with Dr. Ben Cruz on 03/23/2021 at 9:20 p.m. Kaela Heman CT ABDOMEN PELVIS W IV CONTRAST Additional Contrast? None   Final Result   1. Extensive bilateral pulmonary emboli with CT evidence suggestive of right   heart strain. 2. No acute intra-abdominal abnormality. 3. Diverticulosis. 4. Cholelithiasis. 5. Partially calcified pulmonary nodule in the right upper lobe is most   suggestive of granulomatous disease. Findings were discussed with Dr. Ben Cruz on 03/23/2021 at 9:20 p.m. Kaela Heman XR CHEST PORTABLE   Final Result   Minimal left basilar atelectasis or airspace disease. 2.4 cm right perihilar nodule is likely partially calcified though is   incompletely characterized in this chest radiograph. Comparison with any   outside prior would be helpful to establish stability.   Otherwise, in the   nonacute setting, chest CT would be recommended for more complete evaluation. ASSESSMENT:PLAN:    Acute massive pulmonary embolism, unprovoked  Presented with severe hypoxia, syncope, relative hypotension, tachycardia, dyspnea, troponin 0 0.08, BNP normal, CT chest showing bilateral PE with RV strain, RV to LV ratio 1.6  -IV heparin bolus and gtt. -Vascular surgery consult for consideration of EKOS given severe hypoxemia, RV strain, RV/LV 1.6, relative hypotension but patient is not in obstructive shock  -O2 supplementation currently on HFNC  -2D echocardiogram  -Telemetry monitoring  -ICU monitoring  -Heme-onc consult for unprovoked PE  -IV fluids to increase preload  -Rapid covid -ve, low suspicion for covid    Suspected LLE DVT  Left calf girth significantly more than right calf on clinical exam  -VLDoppler lower extremity veins ordered    DVT Prophylaxis: On heparin drip  Diet: No diet orders on file  Code Status: Full code  PT/OT Eval Status: Held, unable to participate    Dispo -IP stay    Total critical care time caring for this patient with life threatening, unstable organ failure, including direct patient contact, management of life support systems, review of data including imaging and labs, discussions with other team members and physicians at least 35 min so far today, excluding procedures. Discussed with the patient and his spouse at the bedside in detail and answered all questions. Yue Madera MD    Thank you Jose De León MD for the opportunity to be involved in this patient's care. If you have any questions or concerns please feel free to contact me at [phone].

## 2021-03-24 NOTE — CONSULTS
Pharmacy to Manage Heparin Infusion per [facility]    Pt wt = 56.5 kg  Baseline aPTT = pending  Start after aPTT obtained    High Dose Heparin Infusion  Heparin 6900 unit IVP bolus followed by Heparin infusion at 15.5 ml/hr  Recheck aPTT in 6 hours. Goal aPTT = 49-76 seconds. MANSOOR Arenas Ph. 3/23/2021 9:37 PM    3/24 0435  aptt = 155 sec  Hold x 1 hr; rate = 10.5 ml/hr  Next aptt 1230  [employer], Pharm D.3/24/2021 5:22 AM    3/25/2021  Recent Labs     03/25/21  0439   APTT 57.6*  57.0*     Continue heparin gtt at 10.5 mL/hr. Recheck aPTT in 6 hours.   Mellisa Mantilla, Georgette  3/25/2021 6:37 AM

## 2021-03-24 NOTE — PROGRESS NOTES
Patient admitted to room 236 from [facility] ED. Patient oriented to room, call light, bed rails, phone, lights and bathroom. Patient instructed about the schedule of the day including: vital sign frequency, lab draws, possible tests, frequency of MD and staff rounds, including RN/MD rounding together at bedside, daily weights, and I &O's. Patient instructed about prescribed diet, how to use 8MENU, and television. bed alarm in place, patient aware of placement and reason. All ICU monitors in place, patient aware of placement and reason. Bed locked, in lowest position, side rails up 2/4, call light within reach. Will continue to monitor.

## 2021-03-24 NOTE — ED NOTES
Dr. Duncan Pod notified of APTT as well as Pharmacy. Note to hold Heparin for an hour.       Morgan Wagner RN  03/24/21 3889

## 2021-03-24 NOTE — CARE COORDINATION
CASE MANAGEMENT INITIAL ASSESSMENT      Reviewed chart and completed assessment with: pt briefly at bedside  Explained Case Management role/services. Primary contact information: wife, John Ga [phone]    Admit date/status: 3/23 Inpatient  Diagnosis: Acute massive PE   Is this a Readmission?:  No      Insurance: [employer]   Precert required for SNF: Yes       3 night stay required: No    Living arrangements, Adls, care needs, prior to admission: Pt lives at home with wife, active, independent. Transportation: Pt to arrange with family. PT/OT recs: Not appropriate at this time    Barriers to discharge: None noted    Plan/comments: Pt denies any needs prior to admission. Pt agreed with CM following his progress and coordinate discharge arrangements as they arise.   JENNYFER Ibarra   ECOC on chart for MD signature

## 2021-03-24 NOTE — ED NOTES
Spoke with Dr. Marylyn Nissen. Dr. Lulu Pierce notified. Also Dr. Lulu Pierce notified of critical APTT and that a redraw would be collected from different arm.       Keanu Ford RN  03/24/21 0504

## 2021-03-24 NOTE — PROGRESS NOTES
Hospitalist Progress Note      PCP: Dayanara Torres MD    Date of Admission: 3/23/2021    Chief Complaint: Shortness of breath    Hospital Course: Active and functional patient comes to hospital with sudden onset of shortness of breath. Diagnosed with massive pulmonary embolism. No provoking factors that could be detected. Right ventricular strain noted. Patient will have catheter directed thrombolysis of the pulmonary artery later today per vascular surgery. Subjective: Has shortness of breath, no chest pain, no nausea, no vomiting, no abdominal pain      Medications:  Reviewed    Infusion Medications    sodium chloride 100 mL/hr (03/24/21 0137)    heparin (PORCINE) Infusion 1,050 Units/hr (03/24/21 0630)    alteplase (ACTIVASE) in 0.9% sodium chloride infusion (EKOS catheter)      And    alteplase (ACTIVASE) in 0.9% sodium chloride infusion (EKOS catheter)      heparin (PORCINE) Infusion      And    heparin (PORCINE) Infusion      sodium chloride      And    sodium chloride       Scheduled Medications    sodium chloride flush  10 mL Intravenous 2 times per day    mupirocin   Nasal BID     PRN Meds: sodium chloride flush, promethazine **OR** ondansetron, polyethylene glycol, acetaminophen **OR** acetaminophen, perflutren lipid microspheres, heparin (porcine), heparin (porcine)      Intake/Output Summary (Last 24 hours) at 3/24/2021 1359  Last data filed at 3/24/2021 1100  Gross per 24 hour   Intake --   Output 400 ml   Net -400 ml       Physical Exam Performed:    /72   Pulse 77   Temp 98.2 °F (36.8 °C) (Oral)   Resp 17   Ht 5' 10\" (1.778 m)   Wt 205 lb 7.5 oz (93.2 kg)   SpO2 97%   BMI 29.48 kg/m²     General appearance: No apparent distress, appears stated age and cooperative. HEENT: Pupils equal, round, and reactive to light. Conjunctivae/corneas clear. Neck: Supple, with full range of motion. No jugular venous distention. Trachea midline.   Respiratory:  Normal respiratory effort. Clear to auscultation, bilaterally without Rales/Wheezes/Rhonchi. No air hunger. No tachypnea. Cardiovascular: Regular rate and rhythm with normal S1/S2 without murmurs, rubs or gallops. Abdomen: Soft, non-tender, non-distended with normal bowel sounds. Musculoskeletal: No clubbing, cyanosis or edema bilaterally. Full range of motion without deformity. Skin: Skin color, texture, turgor normal.  No rashes or lesions. Neurologic:  Neurovascularly intact without any focal sensory/motor deficits. Cranial nerves: II-XII intact, grossly non-focal.  Psychiatric: Alert and oriented, thought content appropriate, normal insight  Capillary Refill: Brisk,< 3 seconds   Peripheral Pulses: +2 palpable, equal bilaterally       Labs:   Recent Labs     03/23/21 1944 03/24/21  0435   WBC 13.0* 10.6   HGB 13.7 13.3*   HCT 41.3 38.5*    155     Recent Labs     03/23/21 1944 03/24/21  0435    141   K 3.6 4.7    108   CO2 24 24   BUN 18 17   CREATININE 1.1 0.9   CALCIUM 9.8 9.1     Recent Labs     03/23/21 1944 03/24/21  0435   AST 20 22   ALT 10 6*   BILIDIR  --  <0.2   BILITOT 0.4 0.4   ALKPHOS 58 51     No results for input(s): INR in the last 72 hours. Recent Labs     03/23/21 1944 03/24/21  0140   TROPONINI 0.08* 0.24*       Urinalysis:      Lab Results   Component Value Date    NITRU Negative 03/24/2021    WBCUA None seen 03/24/2021    RBCUA 0-2 03/24/2021    BLOODU TRACE-INTACT 03/24/2021    SPECGRAV >=1.030 03/24/2021    GLUCOSEU Negative 03/24/2021       Radiology:  CT HEAD WO CONTRAST   Final Result   No acute intracranial abnormality. CT CHEST PULMONARY EMBOLISM W CONTRAST   Final Result   1. Extensive bilateral pulmonary emboli with CT evidence suggestive of right   heart strain. 2. No acute intra-abdominal abnormality. 3. Diverticulosis. 4. Cholelithiasis.    5. Partially calcified pulmonary nodule in the right upper lobe is most   suggestive of granulomatous disease. Findings were discussed with Dr. Gualberto Mcmahon on 03/23/2021 at 9:20 p.m. Gerldine Prost CT ABDOMEN PELVIS W IV CONTRAST Additional Contrast? None   Final Result   1. Extensive bilateral pulmonary emboli with CT evidence suggestive of right   heart strain. 2. No acute intra-abdominal abnormality. 3. Diverticulosis. 4. Cholelithiasis. 5. Partially calcified pulmonary nodule in the right upper lobe is most   suggestive of granulomatous disease. Findings were discussed with Dr. Gualberto Mcmahon on 03/23/2021 at 9:20 p.m. Gerldine Prost XR CHEST PORTABLE   Final Result   Minimal left basilar atelectasis or airspace disease. 2.4 cm right perihilar nodule is likely partially calcified though is   incompletely characterized in this chest radiograph. Comparison with any   outside prior would be helpful to establish stability. Otherwise, in the   nonacute setting, chest CT would be recommended for more complete evaluation. VL Extremity Venous Bilateral    (Results Pending)           Assessment/Plan:    Active Hospital Problems    Diagnosis    Acute massive pulmonary embolism (HCC) [I26.99]     PLAN:    Acute massive pulmonary embolism, unprovoked  Presented with severe hypoxia, syncope, relative hypotension, tachycardia, dyspnea, troponin 0.08. CT chest showing bilateral PE with RV strain  Evaluated by vascular surgery. Catheter directed pulmonary artery thrombolysis is indicated and being planned for later today. Lower extremity venous Dopplers are also ordered. Currently pending  Hematology consulted. Elevated troponin  Most likely demand ischemia with mild elevation secondary to pulmonary embolism. Already on anticoagulation  Echocardiogram ordered  May require cardiac evaluation, probably on an elective basis, if echo shows regional wall motion abnormalities of the left ventricle.     Hypoxia  Does not have acute hypoxemic respiratory failure, but has severe hypoxia secondary to pulmonary embolism. Continue high flow nasal cannula for oxygen supplementation  Monitor oxygen requirements after pulmonary artery thrombolysis which is being planned for later today. Suspected lower extremity DVT  Lower extremity venous Dopplers are pending. DVT Prophylaxis: Therapeutic anticoagulation at this time  Diet: Diet NPO Effective Now  Code Status: Full Code    PT/OT Eval Status: Not indicated for now. Awaiting further information with DVT and PE evaluation and treatment    Dispo -remains in the ICU until after thrombolysis, then needs to be cleared by vascular surgeon to leave the ICU.   Length of stay is uncertain at this time    Elgin Torres MD

## 2021-03-24 NOTE — CONSULTS
Vascular Surgery Consultation    Date of Admission:  3/23/2021  7:36 PM  Date of Consultation:  3/24/2021    PCP:  Hafsa Celeste MD       Chief Complaint: Shortness of Breath    History of Present Illness: We are asked to see this patient in consultation by Dr. Sherwin Mejia regarding bilateral pulmonary emboli. Mansoor Haines is a 79 y.o. male who presented with c/o abrupt onset SOB with near syncope. W/u has included CTPA showing extensive bilateral PE with evidence of Right heart strain. Denies any CP now or at time of event. He has had some left calf swelling for past several days after some minor trauma. No prior history of DVT or PE. Past Medical History:  History reviewed. No pertinent past medical history. Past Surgical History:  History reviewed. No pertinent surgical history.     Home Medications:   Prior to Admission medications    Not on File        Facility Administered Medications:    sodium chloride flush  10 mL Intravenous 2 times per day       Allergies:  Aspirin     Social History:      Social History     Socioeconomic History    Marital status:      Spouse name: Not on file    Number of children: Not on file    Years of education: Not on file    Highest education level: Not on file   Occupational History    Not on file   Social Needs    Financial resource strain: Not on file    Food insecurity     Worry: Not on file     Inability: Not on file    Transportation needs     Medical: Not on file     Non-medical: Not on file   Tobacco Use    Smoking status: Never Smoker    Smokeless tobacco: Never Used   Substance and Sexual Activity    Alcohol use: No    Drug use: No    Sexual activity: Not on file   Lifestyle    Physical activity     Days per week: Not on file     Minutes per session: Not on file    Stress: Not on file   Relationships    Social connections     Talks on phone: Not on file     Gets together: Not on file     Attends Yarsani service: Not on file     Active member of club or organization: Not on file     Attends meetings of clubs or organizations: Not on file     Relationship status: Not on file    Intimate partner violence     Fear of current or ex partner: Not on file     Emotionally abused: Not on file     Physically abused: Not on file     Forced sexual activity: Not on file   Other Topics Concern    Not on file   Social History Narrative    Not on file       Family History:    History reviewed. No pertinent family history. Review of Systems:  A 14 point review of systems was completed. Pertinent positives identified in the HPI, all other review of systems negative. Physical Examination:    /87   Pulse 100   Temp 98.4 °F (36.9 °C) (Oral)   Resp 14   Ht 5' 10\" (1.778 m)   Wt 205 lb 7.5 oz (93.2 kg)   SpO2 90%   BMI 29.48 kg/m²        Admission Weight: 190 lb (86.2 kg)       General appearance: NAD  Eyes: PERRLA  Neck: no JVD, no lymphadenopathy. Respiratory: effort is unlabored, no crackles, wheezes or rubs. Cardiovascular: regular, no murmur. No carotid bruits. No edema or varicosities. Pulses:    femoral   RIGHT 2   LEFT 2   GI: abdomen soft, nondistended, no organomegaly. Musculoskeletal: strength and tone normal.  Extremities: warm and pink. L calf slightly larger than rightSkin: no dermatitis or ulceration. Neuro/psychiatric: grossly intact. ASA 2 - Patient with mild systemic disease with no functional limitations    Mallampati Airway Assessment:  Mallampati Class II - (soft palate, fauces & uvula are visible)      MEDICAL DECISION MAKING/TESTING        CTPA:  Images personally reviewed and interpreted. Large amount of bilateral pulmonary emboli, no saddle PE and emboli in segmental and subsegmental branches  Increased Rv/Lv ratio consistent with Right Heart strain.          Labs:   CBC:   Recent Labs     03/23/21  1944 03/24/21  0435   WBC 13.0* 10.6   HGB 13.7 13.3*   HCT 41.3 38.5*   MCV 97.0 96.3    155     BMP:   Recent Labs     03/23/21 1944 03/24/21  0435    141   K 3.6 4.7    108   CO2 24 24   BUN 18 17   CREATININE 1.1 0.9   CALCIUM 9.8 9.1     Cardiac Enzymes:   Recent Labs     03/23/21 1944 03/24/21  0140   TROPONINI 0.08* 0.24*     PT/INR: No results for input(s): PROTIME, INR in the last 72 hours. APTT:   Recent Labs     03/23/21 2044 03/24/21  0344 03/24/21  0435   APTT 28.9 130.8* 155.0*     Liver Profile:  Lab Results   Component Value Date    AST 20 03/23/2021    ALT 10 03/23/2021    BILITOT 0.4 03/23/2021    ALKPHOS 58 03/23/2021   No results found for: CHOL, HDL, TRIG  TSH:  No results found for: TSH  UA: No results found for: NITRITE, COLORU, PHUR, LABCAST, WBCUA, RBCUA, MUCUS, TRICHOMONAS, YEAST, BACTERIA, CLARITYU, SPECGRAV, LEUKOCYTESUR, UROBILINOGEN, BILIRUBINUR, BLOODU, GLUCOSEU, AMORPHOUS    Troponin:  0.24    Diagnosis:  Acute bilateral PE with evidence of Right Heart strain. Plan: PA thrombolysis. I have explained the following to the patient and his/her family: Thrombolysis is the use of medication to dissolve or break down a blood clot that is blocking blood flow. Catheter directed means that the thrombolysis will be carried out by inserting a catheter into the blood vessel to deliver the medication directly to the blood clot. Thrombolysis is performed as an extra step to an angiogram procedure. Thrombolysis is used instead of surgery to treat blood clots. The risks and complications with this procedure can include but are not limited to the following. Common risks and complications include:   Minor pain, bruising and/or infection from the IV cannula. This may require treatment with antibiotics. Pain or discomfort at the puncture site. This may require medication. Minor bleeding or bruising around the catheter. This is usually stopped by applying pressure and/or ice to the catheter insertion site.    Failure of the thrombolytic medication to completely dissolve the blood clot. Surgery may be required to remove the blood clot. Less common risks and complications include:  Infection, requiring antibiotics and further treatment. Damage to surrounding structures such as blood vessels, organs and muscles, requiring further treatment. Stroke or spontaneous bleeding in other organs, such as stomach and bowel. This is due to the thrombolytic and blood thinning medications given during the procedure. The procedure will be stopped and surgery may be required to stop the bleeding. A blood clot or excessive bleeding from the puncture site. This may require other treatment and/or corrective surgery. An allergy to injected drugs, requiring further treatment. The procedure may not be possible due to medical and/or technical reasons.

## 2021-03-24 NOTE — PROGRESS NOTES
[facility]    Consult Note      Requesting Physician:  Dr. Rubina Rendon:  PE        HISTORY OF PRESENT ILLNESS:      Mr. Josh Khanna  is a 79 y.o. male we are seeing in consultation for large, first, unprovoked thrombus with Bilateral massive PE. He is getting EKOS this afternoon. Now on hep drip and seen in the ICU. CBC with WBC 10, Hgb 13.3, and plate are 113A. Never had a C-scope. No family hx of clotting or cancer per patient. Last saw a physician 3 years prior. Past Medical History:    History reviewed. No pertinent past medical history. Past Surgical History:    History reviewed. No pertinent surgical history.     Current Medications:    Current Facility-Administered Medications   Medication Dose Route Frequency Provider Last Rate Last Admin    sodium chloride flush 0.9 % injection 10 mL  10 mL Intravenous 2 times per day Eliseo Toro MD        sodium chloride flush 0.9 % injection 10 mL  10 mL Intravenous PRN Eliseo Toro MD        promethazine (PHENERGAN) tablet 12.5 mg  12.5 mg Oral Q6H PRN Eliseo Toro MD        Or    ondansetron (ZOFRAN) injection 4 mg  4 mg Intravenous Q6H PRN Eliseo Toro MD        polyethylene glycol (GLYCOLAX) packet 17 g  17 g Oral Daily PRN Eliseo Toro MD        acetaminophen (TYLENOL) tablet 650 mg  650 mg Oral Q6H PRN Eliseo Toro MD        Or    acetaminophen (TYLENOL) suppository 650 mg  650 mg Rectal Q6H PRN Eliseo Toro MD        perflutren lipid microspheres (DEFINITY) injection 1.65 mg  1.5 mL Intravenous ONCE PRN Eliseo Toro MD        0.9 % sodium chloride infusion   Intravenous Continuous Eliseo Toro  mL/hr at 03/24/21 0137 100 mL/hr at 03/24/21 0137    heparin 25,000 unit in sodium chloride 0.45% 250 mL (premix) infusion  1,050 Units/hr Intravenous Continuous Eliseo Toro MD 10.5 mL/hr at 03/24/21 0630 1,050 Units/hr at 03/24/21 0630    alteplase (CATHFLO) 12.5 mg in sodium chloride 0.9 % 250 mL infusion  1 mg/hr Intracatheter Continuous Twila Ahumada, MD        And    alteplase (CATHFLO) 12.5 mg in sodium chloride 0.9 % 250 mL infusion  1 mg/hr Intracatheter Continuous Twila Ahumada, MD        heparin 25,000 unit in sodium chloride 0.45% 250 mL (premix) infusion  250 Units/hr Intracatheter Continuous Twila Ahumada, MD        And    heparin 25,000 unit in sodium chloride 0.45% 250 mL (premix) infusion  250 Units/hr Intracatheter Continuous Twila Ahumada, MD        0.9 % sodium chloride infusion   Intracatheter Continuous Twila Ahumada, MD        And    0.9 % sodium chloride infusion   Intracatheter Continuous Twila Ahumada, MD        mupirocin (BACTROBAN) 2 % ointment   Nasal BID Colten Grullon MD   Given at 03/24/21 0847    heparin (porcine) injection 6,900 Units  80 Units/kg Intravenous PRN Jing Bauer MD        heparin (porcine) injection 3,450 Units  40 Units/kg Intravenous PRN Jing Bauer MD         Allergies:     Allergies   Allergen Reactions    Aspirin        Social History:   Social History     Socioeconomic History    Marital status:      Spouse name: Not on file    Number of children: Not on file    Years of education: Not on file    Highest education level: Not on file   Occupational History    Not on file   Social Needs    Financial resource strain: Not on file    Food insecurity     Worry: Not on file     Inability: Not on file    Transportation needs     Medical: Not on file     Non-medical: Not on file   Tobacco Use    Smoking status: Never Smoker    Smokeless tobacco: Never Used   Substance and Sexual Activity    Alcohol use: No    Drug use: No    Sexual activity: Not on file   Lifestyle    Physical activity     Days per week: Not on file     Minutes per session: Not on file    Stress: Not on file   Relationships    Social connections     Talks on phone: Not on file Gets together: Not on file     Attends [employer] service: Not on file     Active member of club or organization: Not on file     Attends meetings of clubs or organizations: Not on file     Relationship status: Not on file    Intimate partner violence     Fear of current or ex partner: Not on file     Emotionally abused: Not on file     Physically abused: Not on file     Forced sexual activity: Not on file   Other Topics Concern    Not on file   Social History Narrative    Not on file          Family History:     History reviewed. No pertinent family history. REVIEW OF SYSTEMS:    Review of Systems  10 point ROS negative unless noted above   PHYSICAL EXAM:      Vitals:  /84   Pulse 83   Temp 98.3 °F (36.8 °C) (Oral)   Resp 14   Ht 5' 10\" (1.778 m)   Wt 205 lb 7.5 oz (93.2 kg)   SpO2 96%   BMI 29.48 kg/m²     CONSTITUTIONAL:  awake, alert, cooperative, no apparent distress, and appears stated age NAD  EYES:  pupils equal, round and reactive to light, extra ocular muscles intact, sclera clear, conjunctiva normal  NECK:Supple, symmetrical, trachea midline, no adenopathy, thyroid symmetric, not enlarged and no tenderness, skin normal  HEMATOLOGIC/LYMPHATICS:  no cervical lymphadenopathy, no supraclavicular lymphadenopathy, no axillarylymphadenopathy and no inguinal lymphadenopathy  LUNGS:  No increased work of breathing, good air exchange, clear to auscultation bilaterally, no crackles or wheezing  CARDIOVASCULAR:  , regular rate and rhythm, normalS1 and S2, no S3 or S4, and no murmur noted  ABDOMEN:  No scars, normal bowel sounds, soft, non-distended, non-tender, no masses palpated, no hepatosplenomegally  MUSCULOSKELETAL:  There is no redness, warmth,or swelling of the joints. Full range of motion noted. Motor strength is 5 out of 5 all extremities bilaterally. NEUROLOGIC:  Awake, alert, oriented to name, place and time. Cranial nerves II-XII are grossly intact. Motor is 5 out of 5 bilaterally. SKIN:  no bruising or bleeding      DATA:    PT/INR:    Recent Labs     03/23/21  1944 03/24/21  0435   PROT 6.9 6.5     PTT:    Recent Labs     03/23/21  2044 03/24/21  0344 03/24/21  0435   APTT 28.9 130.8* 155.0*     CMP:    Lab Results   Component Value Date     03/24/2021    K 4.7 03/24/2021     03/24/2021    CO2 24 03/24/2021    BUN 17 03/24/2021    PROT 6.5 03/24/2021     :  No results found for: MG  Phosphorus:  No components found for: PO4  Calcium:  No components found for: CA  CBC:    Lab Results   Component Value Date    WBC 10.6 03/24/2021    RBC 4.00 03/24/2021    HGB 13.3 03/24/2021    HCT 38.5 03/24/2021    MCV 96.3 03/24/2021    RDW 13.4 03/24/2021     03/24/2021     DIFF:  Lab Results   Component Value Date    MCV 96.3 03/24/2021    RDW 13.4 03/24/2021      CT CAP    1. Extensive bilateral pulmonary emboli with CT evidence suggestive of right   heart strain. 2. No acute intra-abdominal abnormality. 3. Diverticulosis. 4. Cholelithiasis. 5. Partially calcified pulmonary nodule in the right upper lobe is most   suggestive of granulomatous disease. Findings were discussed with Dr. Antwan Roman on 03/23/2021 at 9:20 p.m. Dipesh Silva          Problem List  Patient Active Problem List   Diagnosis    Acute massive pulmonary embolism ([facility] [address]       IMPRESSION/RECOMMENDATIONS:    Bilateral PE  - EKOS this afternoon  - Hyper coag work up and genetic testing outpatient in non acute phase   - Hep drip- Eliquis on dc for at least 6 months pending clinical stability and hyper coag results   - First event and not provoked  - Normal weight and non smoker  - no recent travel or trauma  - No recent surgery   - Not on testosterone replacement       CA screening  - check PSA and stool for occult blood  - CTPA no chest mass  - Never had a C-scope; outpatient cologuard per patient request. Average risk patient- no family hx of cancer   - CA A/P no signs of malignancy     MD to see in the AM. Thank you for asking me to see the patient.        Laurita Edwards,CNP  [facility]   Please Contact Through [employer]

## 2021-03-24 NOTE — PROGRESS NOTES
03/24/21 @ 85 [facility] Hematology oncology    [phone] [id number] or Facility: [facility] From: [place] RE: Ursula Torres 1950 RM: [id number] Routine Consult for: unprovoked PE Need Callback: NO CALLBACK REQ CCU ROUTINE

## 2021-03-24 NOTE — PROGRESS NOTES
Vascular    PE with Right Heart strain by CT, elevated Troponin, increased Oxygen demand. Discussed PA lysis with patient- will proceed this afternoon.

## 2021-03-24 NOTE — PROGRESS NOTES
Vascular    Pulmonary artery EKOS lytic catheters placed bilaterally. Infusion started. Note dictated. Clothing

## 2021-03-25 ENCOUNTER — APPOINTMENT (OUTPATIENT)
Dept: VASCULAR LAB | Age: 71
DRG: 167 | End: 2021-03-25
Payer: MEDICARE

## 2021-03-25 VITALS
DIASTOLIC BLOOD PRESSURE: 80 MMHG | SYSTOLIC BLOOD PRESSURE: 133 MMHG | OXYGEN SATURATION: 94 % | TEMPERATURE: 98.1 F | BODY MASS INDEX: 29.42 KG/M2 | WEIGHT: 205.47 LBS | HEIGHT: 70 IN | RESPIRATION RATE: 18 BRPM | HEART RATE: 78 BPM

## 2021-03-25 LAB
APTT: 57 SEC (ref 24.2–36.2)
APTT: 57.6 SEC (ref 24.2–36.2)
FIBRINOGEN: 329 MG/DL (ref 200–397)
HCT VFR BLD CALC: 38.5 % (ref 40.5–52.5)
HEMOGLOBIN: 12.9 G/DL (ref 13.5–17.5)
MCH RBC QN AUTO: 32.6 PG (ref 26–34)
MCHC RBC AUTO-ENTMCNC: 33.6 G/DL (ref 31–36)
MCV RBC AUTO: 97 FL (ref 80–100)
PDW BLD-RTO: 13.8 % (ref 12.4–15.4)
PLATELET # BLD: 136 K/UL (ref 135–450)
PMV BLD AUTO: 9.9 FL (ref 5–10.5)
RBC # BLD: 3.96 M/UL (ref 4.2–5.9)
WBC # BLD: 8.3 K/UL (ref 4–11)

## 2021-03-25 PROCEDURE — 85730 THROMBOPLASTIN TIME PARTIAL: CPT

## 2021-03-25 PROCEDURE — 6370000000 HC RX 637 (ALT 250 FOR IP): Performed by: INTERNAL MEDICINE

## 2021-03-25 PROCEDURE — 36415 COLL VENOUS BLD VENIPUNCTURE: CPT

## 2021-03-25 PROCEDURE — 85384 FIBRINOGEN ACTIVITY: CPT

## 2021-03-25 PROCEDURE — 93970 EXTREMITY STUDY: CPT

## 2021-03-25 PROCEDURE — 2580000003 HC RX 258: Performed by: SURGERY

## 2021-03-25 PROCEDURE — 85027 COMPLETE CBC AUTOMATED: CPT

## 2021-03-25 PROCEDURE — 2500000003 HC RX 250 WO HCPCS: Performed by: SURGERY

## 2021-03-25 RX ADMIN — APIXABAN 10 MG: 5 TABLET, FILM COATED ORAL at 09:44

## 2021-03-25 RX ADMIN — Medication 10 ML: at 08:39

## 2021-03-25 RX ADMIN — HEPARIN SODIUM 1050 UNITS/HR: 10000 INJECTION, SOLUTION INTRAVENOUS at 00:32

## 2021-03-25 RX ADMIN — MUPIROCIN: 20 OINTMENT TOPICAL at 08:39

## 2021-03-25 RX ADMIN — Medication 10 ML: at 00:32

## 2021-03-25 NOTE — PROGRESS NOTES
Rounded with Dr. Alex Ibrahim. OK to d/c patient home today. Rounded with Dr. Sarah Beth Orozco. Will discharge home. Start pt on 10mg Eliquis before leaving. Obtain Echo outpatient and follow up with Oncology.

## 2021-03-25 NOTE — OP NOTE
[address] [address]                                OPERATIVE REPORT    PATIENT NAME: Tamika Denton                    :        1950  MED REC NO:   [id number]                          ROOM:       0236  ACCOUNT NO:   [de-identified]                           ADMIT DATE: 2021  PROVIDER:     Bee Phelan MD    DATE OF PROCEDURE:  2021    PREOPERATIVE DIAGNOSIS:  Bilateral acute pulmonary emboli with evidence  of cor pulmonale. POSTOPERATIVE DIAGNOSIS:  Bilateral acute pulmonary emboli with evidence  of cor pulmonale. PROCEDURES:  1. Ultrasound-guided right femoral venous access x2.  2.  Placement of catheters in subsegmental right and subsegmental left  pulmonary arteries. 3.  Institution of bilateral pulmonary artery thrombolysis. SURGEON:  Bee Phelan MD    ANESTHESIA:  Local with moderate monitored sedation. INDICATIONS:  The patient is a 70-year-old male who presented with acute  bilateral PE. CT scan showed a large volume PE with evidence of cor  pulmonale with a dilated right ventricle and elevated troponins. The  patient is brought to the angio suite at this time to undergo placement  of pulmonary artery EKOS thrombolytic infusion catheters and institution  of thrombolytic therapy. OPERATIVE PROCEDURE:  The patient was brought to the angio suite, placed  in the spine position. Under my direct supervision, Versed and fentanyl  were administered for moderate sedation. The patient was monitored by  an independent trained nurse observer using continuous blood pressure,  EKG and pulse oximetry. I spent 37 minutes face-to-face with the  patient providing and directing sedation. The right femoral region was then prepped and draped in a sterile  fashion. Ultrasound was used to identify the common femoral vein. This  was patent and easily compressible and free of thrombus.   This was  accessed twice under direct ultrasound visualization. Digital copies of  the ultrasound images were saved and placed in the patient's record. Two 6-Latvian introducer sheaths were then placed into the femoral vein. Through the introducer sheaths, Bentson wire and pigtail catheter were  advanced directing the catheter and wire into the right atrium, right  ventricle, then pulmonary outflow tract. One wire was directed into the  inferior right pulmonary artery and one in the left inferior pulmonary  artery. Over the wires, 12-cm infusion length EKOS lytic catheters were  advanced. The sheaths were secured to the skin using 2-0 silk suture  and the catheters secured to the sheaths using Steri-Strips. Once with the catheters in place, the Bentson wires were removed and the  ultrasound EKOS wires were placed within the lumens of the catheters. Catheters were then dressed with sterile occlusive dressing and  thrombolytic infusions were started via each catheter at 1 mg per hour. There were no complications to this procedure. Estimated blood loss was  minimal.    At the end of the procedure, the patient was transferred to the  intensive care unit in a stable condition.         Luis Melchor MD    D: 03/24/2021 15:57:04       T: 03/24/2021 16:03:47     [email or web address]  Job#: [id number]     Doc#: [id number]    CC:

## 2021-03-25 NOTE — PROGRESS NOTES
Vascular    S/P PA Thrombolysis with excellent clinical resultoff Oxygen without SOB. Ok to increase activity as tolerated. Switch to oral anticoagulation.    Discharge per Hospitalist.

## 2021-03-25 NOTE — PROGRESS NOTES
Received report from night shift RN. Pts EKOS removed at 1130 last night. Pts VSS overnight, currently on room air. Pulses doppler on lower extremities, cool to touch.  Dr. Anika Salter with pt moving out of ICU per hospitalist.

## 2021-03-25 NOTE — DISCHARGE SUMMARY
Hospital Medicine Discharge Summary    Patient ID: Byron Lechuga      Patient's PCP: Dayanara Torres MD    Admit Date: 3/23/2021     Discharge Date:   03/25/21     Admitting Physician: Anita Perez MD     Discharge Physician: Elgin Torres MD     Discharge Diagnoses: Active Hospital Problems    Diagnosis    Acute pulmonary embolism with acute cor pulmonale (HCC) [I26.09]    Acute massive pulmonary embolism ([address] [I26.99]       The patient was seen and examined on day of discharge and this discharge summary is in conjunction with any daily progress note from day of discharge. Hospital Course:      Discharge on active and functional patient who presented to hospital with sudden onset of shortness of breath. Diagnosed with massive pulmonary embolism. No provoking factors that could be detected. Right ventricular strain noted on CT. Patient was evaluated by vascular surgery, underwent catheter directed thrombolysis of the pulmonary artery the day before discharge. Was evaluated again the day of discharge by vascular surgery after, and that he was ready to be discharged home. Lower extremity venous Dopplers showed left femoral DVT, acute. This is unprovoked. Required high levels of oxygen on arrival.  Was back on room air after pulmonary artery thrombolysis. Heparin stopped. Patient was started on Eliquis. First loading dose will be administered in the hospital, and patient will be discharged with prescription of the first month of Eliquis. Patient was also seen by hematology. Cannot have hematology work-up while he is on heparin. Hypercoagulability work-up was deferred to outpatient. Patient was given contact information for hematology practice. Echocardiogram was not performed as inpatient. Given patient is back on room air after pulmonary artery thrombolysis, there is no indication for an urgent echo. This was confirmed by vascular surgery.   Patient was given an order for outpatient scheduling of echocardiogram so he can follow-up with specialists. Patient was on room air, asymptomatic on the day of discharge. Questions were answered to his satisfaction. I also discussed with vascular surgery. Patient will have catheter directed thrombolysis of the pulmonary artery later today per vascular surgery. Physical Exam Performed:     BP (!) 120/93   Pulse 75   Temp 98.1 °F (36.7 °C) (Oral)   Resp 16   Ht 5' 10\" (1.778 m)   Wt 205 lb 7.5 oz (93.2 kg)   SpO2 94%   BMI 29.48 kg/m²       General appearance:  No apparent distress, appears stated age and cooperative. HEENT:  Normal cephalic, atraumatic without obvious deformity. Pupils equal, round, and reactive to light. Extra ocular muscles intact. Conjunctivae/corneas clear. Neck: Supple, with full range of motion. No jugular venous distention. Trachea midline. Respiratory:  Normal respiratory effort. Clear to auscultation, bilaterally without Rales/Wheezes/Rhonchi. Cardiovascular:  Regular rate and rhythm with normal S1/S2 without murmurs, rubs or gallops. Abdomen: Soft, non-tender, non-distended with normal bowel sounds. Musculoskeletal:  No clubbing, cyanosis or edema bilaterally. Full range of motion without deformity. Skin: Skin color, texture, turgor normal.  No rashes or lesions. Neurologic:  Neurovascularly intact without any focal sensory/motor deficits. Cranial nerves: II-XII intact, grossly non-focal.  Psychiatric:  Alert and oriented, thought content appropriate, normal insight  Capillary Refill: Brisk,< 3 seconds   Peripheral Pulses: +2 palpable, equal bilaterally       Labs:  For convenience and continuity at follow-up the following most recent labs are provided:      CBC:    Lab Results   Component Value Date    WBC 8.3 03/25/2021    HGB 12.9 03/25/2021    HCT 38.5 03/25/2021     03/25/2021       Renal:    Lab Results   Component Value Date     03/24/2021    K 4.7 03/24/2021     03/24/2021    CO2 24 03/24/2021    BUN 17 03/24/2021    CREATININE 0.9 03/24/2021    CALCIUM 9.1 03/24/2021         Significant Diagnostic Studies    Radiology:   VL Extremity Venous Bilateral         CT HEAD WO CONTRAST   Final Result   No acute intracranial abnormality. CT CHEST PULMONARY EMBOLISM W CONTRAST   Final Result   1. Extensive bilateral pulmonary emboli with CT evidence suggestive of right   heart strain. 2. No acute intra-abdominal abnormality. 3. Diverticulosis. 4. Cholelithiasis. 5. Partially calcified pulmonary nodule in the right upper lobe is most   suggestive of granulomatous disease. Findings were discussed with Dr. Dayton Hilliard on 03/23/2021 at 9:20 p.m. [employer] CT ABDOMEN PELVIS W IV CONTRAST Additional Contrast? None   Final Result   1. Extensive bilateral pulmonary emboli with CT evidence suggestive of right   heart strain. 2. No acute intra-abdominal abnormality. 3. Diverticulosis. 4. Cholelithiasis. 5. Partially calcified pulmonary nodule in the right upper lobe is most   suggestive of granulomatous disease. Findings were discussed with Dr. Dayton Hilliard on 03/23/2021 at 9:20 p.m. [employer] XR CHEST PORTABLE   Final Result   Minimal left basilar atelectasis or airspace disease. 2.4 cm right perihilar nodule is likely partially calcified though is   incompletely characterized in this chest radiograph. Comparison with any   outside prior would be helpful to establish stability. Otherwise, in the   nonacute setting, chest CT would be recommended for more complete evaluation. Consults:     IP CONSULT TO HOSPITALIST  IP CONSULT TO HEM/ONC  IP CONSULT TO VASCULAR SURGERY    Disposition: Home in asymptomatic stable condition    Condition at Discharge: Stable    Discharge Instructions/Follow-up: Hematology as outpatient. Echocardiogram order also given.     Code Status:  Full Code     Activity: activity as tolerated    Diet: regular diet      Discharge Medications:     Current Discharge Medication List           Details   apixaban (ELIQUIS) 5 MG TABS tablet Take 2 tablets by mouth 2 times daily for 7 days, THEN 1 tablet 2 times daily for 23 days. Qty: 74 tablet, Refills: 0             Time Spent on discharge is more than 45 minutes in the examination, evaluation, counseling and review of medications and discharge plan. Signed:    Robert West MD   3/25/2021      Thank you Iglesia Guzman MD for the opportunity to be involved in this patient's care. If you have any questions or concerns please feel free to contact me at [phone].

## 2021-03-25 NOTE — PROGRESS NOTES
Pt discharged home with wife. Instructed on Eliquis, bleeding precautions, and follow up information. All belongings sent with patient.

## 2021-03-25 NOTE — CARE COORDINATION
Aware of patient discharge order. Met with patient at bedside. Denies needs.       Shaneka Loving RN

## 2021-03-25 NOTE — PROGRESS NOTES
ONCOLOGY HEMATOLOGY CARE PROGRESS NOTE      SUBJECTIVE:     The patient states his breathing is much better. He denies fever, chills, nausea or vomiting. ROS:   The remaining 10 point review of symptoms is unremarkable. OBJECTIVE        Physical    VITALS:  BP (!) 120/93   Pulse 75   Temp 98.1 °F (36.7 °C) (Oral)   Resp 16   Ht 5' 10\" (1.778 m)   Wt 205 lb 7.5 oz (93.2 kg)   SpO2 94%   BMI 29.48 kg/m²   TEMPERATURE:  Current - Temp: 98.1 °F (36.7 °C); Max - Temp  Av °F (36.7 °C)  Min: 97.8 °F (36.6 °C)  Max: 98.1 °F (36.7 °C)  PULSE OXIMETRY RANGE: SpO2  Av.6 %  Min: 93 %  Max: 97 %  24HR INTAKE/OUTPUT:      Intake/Output Summary (Last 24 hours) at 3/25/2021 1217  Last data filed at 3/25/2021 0847  Gross per 24 hour   Intake 1221 ml   Output 450 ml   Net 771 ml       CONSTITUTIONAL:  awake, alert, cooperative, no apparent distress, HEENT oral pharynx , no scleral icterus  HEMATOLOGIC/LYMPHATICS:  no cervical lymphadenopathy, no supraclavicular lymphadenopathy, no axillary lymphadenopathy and no inguinal lymphadenopathy  LUNGS:  No increased work of breathing, good air exchange, clear to auscultation bilaterally, no crackles or wheezing  CARDIOVASCULAR:  , regular rate and rhythm, normal S1 and S2, no S3 or S4, and no murmur noted  ABDOMEN:  No scars, normal bowel sounds, soft, non-distended, non-tender, no masses palpated, no hepatosplenomegally  MUSCULOSKELETAL:  There is no redness, warmth, or swelling of the joints. EXTREMETIES: No clubbing cynosis or edema  NEUROLOGIC:  Awake, alert, oriented to name, place and time. Cranial nerves II-XII are grossly intact. Motor is 5 out of 5 bilaterally.    SKIN:  no bruising or bleeding      Data      Recent Labs     21  1648 21  2207 21  0439   WBC 8.9 8.7 8.3   HGB 13.1* 12.5* 12.9*   HCT 39.8* 36.8* 38.5*    122* 136   MCV 98.2 96.8 97.0        Recent Labs     21 03/24/21 0435    141   K 3.6 4.7    108   CO2 24 24   BUN 18 17   CREATININE 1.1 0.9     Recent Labs     03/23/21 1944 03/24/21 0435   AST 20 22   ALT 10 6*   BILIDIR  --  <0.2   BILITOT 0.4 0.4   ALKPHOS 58 51       Magnesium:  No results found for: MG      Problem List  Patient Active Problem List   Diagnosis    Acute massive pulmonary embolism (HCC)    Acute pulmonary embolism with acute cor pulmonale (HCC)       ASSESSMENT AND PLAN:    Pulmonary embolism:  -This was unprovoked  -He is status post EKOS procedure and did very well  -There is no personal history of previous DVT/pulmonary embolism and there is no family history.  -We have no reason to believe something such as antiphospholipid and body syndrome based on his presentation.  -He should return to the office in about 2 weeks for a work-up since this was unprovoked  -He is incredibly active for 79year-old.  -We will also make sure that he has proper screening such as colonoscopy, etc, But malignancies rarely appear in this manner. When they do, they are virtually always stage IV and terminal.  -He was a non-smoker.           ONCOLOGIC DISPOSITION:    -No oncology or hematology barrier to discharge    Andrew Robbins MD  Please contact through [address]

## 2021-03-30 ENCOUNTER — TELEPHONE (OUTPATIENT)
Dept: CASE MANAGEMENT | Age: 71
End: 2021-03-30

## 2021-03-30 NOTE — TELEPHONE ENCOUNTER
Imaging report XR CHEST 3/23/2021 and F/U imaging recommendations faxed to Dr. Guille Wright PCP with [facility]. Fax number verified with office.     Thank you,  Mary Alvarado RN  [facility] Lung Navigator  [phone]

## 2021-04-02 NOTE — CONSULTS
[facility]     Consult Note        Requesting Physician:  Dr. Zofia Rice      CHIEF COMPLAINT:  PE           HISTORY OF PRESENT ILLNESS:       Mr. Austin  is a 79 y.o. male we are seeing in consultation for large, first, unprovoked thrombus with Bilateral massive PE. He is getting EKOS this afternoon. Now on hep drip and seen in the ICU. CBC with WBC 10, Hgb 13.3, and plate are 481P. Never had a C-scope. No family hx of clotting or cancer per patient. Last saw a physician 3 years prior.         Past Medical History:    Past Medical History   History reviewed. No pertinent past medical history. Past Surgical History:    Past Surgical History   History reviewed.  No pertinent surgical history.        Current Medications:    Current Facility-Administered Medications             Current Facility-Administered Medications   Medication Dose Route Frequency Provider Last Rate Last Admin    sodium chloride flush 0.9 % injection 10 mL  10 mL Intravenous 2 times per day Pan Clemens MD        sodium chloride flush 0.9 % injection 10 mL  10 mL Intravenous PRN Pan Clemens MD        promethazine (PHENERGAN) tablet 12.5 mg  12.5 mg Oral Q6H PRN Pan Clemens MD         Or    ondansetron (ZOFRAN) injection 4 mg  4 mg Intravenous Q6H PRN Pan Clemens MD        polyethylene glycol (GLYCOLAX) packet 17 g  17 g Oral Daily PRN Pan Clemens MD        acetaminophen (TYLENOL) tablet 650 mg  650 mg Oral Q6H PRN Pan Clemens MD         Or    acetaminophen (TYLENOL) suppository 650 mg  650 mg Rectal Q6H PRN Pan Clemens MD        perflutren lipid microspheres (DEFINITY) injection 1.65 mg  1.5 mL Intravenous ONCE PRN Pan Clemens MD        0.9 % sodium chloride infusion   Intravenous Continuous Pan Clemens  mL/hr at 03/24/21 0137 100 mL/hr at 03/24/21 0137    heparin 25,000 unit in sodium chloride 0.45% 250 mL (premix) infusion  1,050 Units/hr Intravenous Continuous Vanda Moran MD 10.5 mL/hr at 03/24/21 0630 1,050 Units/hr at 03/24/21 0630    alteplase (CATHFLO) 12.5 mg in sodium chloride 0.9 % 250 mL infusion  1 mg/hr Intracatheter Continuous Katie Elmore MD         And    alteplase (CATHFLO) 12.5 mg in sodium chloride 0.9 % 250 mL infusion  1 mg/hr Intracatheter Continuous Katie Elmore MD        heparin 25,000 unit in sodium chloride 0.45% 250 mL (premix) infusion  250 Units/hr Intracatheter Continuous Katie Elmore MD         And    heparin 25,000 unit in sodium chloride 0.45% 250 mL (premix) infusion  250 Units/hr Intracatheter Continuous Katie Elmore MD        0.9 % sodium chloride infusion   Intracatheter Continuous Katie Elmore MD         And    0.9 % sodium chloride infusion   Intracatheter Continuous Katie Elmore MD        mupirocin (BACTROBAN) 2 % ointment   Nasal BID Deja Lyons MD   Given at 03/24/21 0847    heparin (porcine) injection 6,900 Units  80 Units/kg Intravenous PRN Vanda Moran MD        heparin (porcine) injection 3,450 Units  40 Units/kg Intravenous PRN Vanda Moran MD             Allergies:          Allergies   Allergen Reactions    Aspirin           Social History:   Social History               Socioeconomic History    Marital status:        Spouse name: Not on file    Number of children: Not on file    Years of education: Not on file    Highest education level: Not on file   Occupational History    Not on file   Social Needs    Financial resource strain: Not on file    Food insecurity       Worry: Not on file       Inability: Not on file    Transportation needs       Medical: Not on file       Non-medical: Not on file   Tobacco Use    Smoking status: Never Smoker    Smokeless tobacco: Never Used   Substance and Sexual Activity    Alcohol use: No    Drug use: No    Sexual activity: Not on file   Lifestyle    Physical activity       Days per week: Not on file       Minutes per session: Not on file    Stress: Not on file   Relationships    Social connections       Talks on phone: Not on file       Gets together: Not on file       Attends [employer] service: Not on file       Active member of club or organization: Not on file       Attends meetings of clubs or organizations: Not on file       Relationship status: Not on file    Intimate partner violence       Fear of current or ex partner: Not on file       Emotionally abused: Not on file       Physically abused: Not on file       Forced sexual activity: Not on file   Other Topics Concern    Not on file   Social History Narrative    Not on file              Family History:     Family History   History reviewed. No pertinent family history.      REVIEW OF SYSTEMS:    Review of Systems  10 point ROS negative unless noted above   PHYSICAL EXAM:       Vitals:  /84   Pulse 83   Temp 98.3 °F (36.8 °C) (Oral)   Resp 14   Ht 5' 10\" (1.778 m)   Wt 205 lb 7.5 oz (93.2 kg)   SpO2 96%   BMI 29.48 kg/m²      CONSTITUTIONAL:  awake, alert, cooperative, no apparent distress, and appears stated age NAD  EYES:  pupils equal, round and reactive to light, extra ocular muscles intact, sclera clear, conjunctiva normal  NECK:Supple, symmetrical, trachea midline, no adenopathy, thyroid symmetric, not enlarged and no tenderness, skin normal  HEMATOLOGIC/LYMPHATICS:  no cervical lymphadenopathy, no supraclavicular lymphadenopathy, no axillarylymphadenopathy and no inguinal lymphadenopathy  LUNGS:  No increased work of breathing, good air exchange, clear to auscultation bilaterally, no crackles or wheezing  CARDIOVASCULAR:  , regular rate and rhythm, normalS1 and S2, no S3 or S4, and no murmur noted  ABDOMEN:  No scars, normal bowel sounds, soft, non-distended, non-tender, no masses palpated, no hepatosplenomegally  MUSCULOSKELETAL:  There is no redness, warmth,or swelling of the joints. Full range of motion noted. Motor strength is 5 out of 5 all extremities bilaterally. NEUROLOGIC:  Awake, alert, oriented to name, place and time. Cranial nerves II-XII are grossly intact. Motor is 5 out of 5 bilaterally. SKIN:  no bruising or bleeding        DATA:     PT/INR:         Recent Labs     03/23/21  1944 03/24/21  0435   PROT 6.9 6.5      PTT:          Recent Labs     03/23/21  2044 03/24/21  0344 03/24/21  0435   APTT 28.9 130.8* 155.0*      CMP:          Lab Results   Component Value Date      03/24/2021     K 4.7 03/24/2021      03/24/2021     CO2 24 03/24/2021     BUN 17 03/24/2021     PROT 6.5 03/24/2021      :  No results found for: MG  Phosphorus:  No components found for: PO4  Calcium:  No components found for: CA  CBC:          Lab Results   Component Value Date     WBC 10.6 03/24/2021     RBC 4.00 03/24/2021     HGB 13.3 03/24/2021     HCT 38.5 03/24/2021     MCV 96.3 03/24/2021     RDW 13.4 03/24/2021      03/24/2021      DIFF:        Lab Results   Component Value Date     MCV 96.3 03/24/2021     RDW 13.4 03/24/2021      CT CAP     1. Extensive bilateral pulmonary emboli with CT evidence suggestive of right   heart strain. 2. No acute intra-abdominal abnormality. 3. Diverticulosis. 4. Cholelithiasis. 5. Partially calcified pulmonary nodule in the right upper lobe is most   suggestive of granulomatous disease. Findings were discussed with Dr. Deya Negron on 03/23/2021 at 9:20 p.m. .            Problem List      Patient Active Problem List   Diagnosis    Acute massive pulmonary embolism ([address]         IMPRESSION/RECOMMENDATIONS:     Bilateral PE  - EKOS this afternoon  - Hyper coag work up and genetic testing outpatient in non acute phase   - Hep drip- Eliquis on dc for at least 6 months pending clinical stability and hyper coag results   - First event and not provoked  - Normal weight and non smoker  - no recent travel or trauma  - No recent surgery   - Not on testosterone replacement         CA screening  - check PSA and stool for occult blood  - CTPA no chest mass  - Never had a C-scope; outpatient cologuard per patient request. Average risk patient- no family hx of cancer   - CA A/P no signs of malignancy      MD to see in the AM.            Thank you for asking me to see the patient.         Nilo Mike  [facility]   Please Contact Through [employer]      The patient was seen by Manoj Clemente, certified nurse practitioner. Unfortunately, she put her initial consult note on a progress note template and this note had to be repasted. The original note is actually in the chart. I agree with her assessment and plan. We will likely need to reevaluate him in 6 months to see whether to continue his anticoagulation given the significance of his pulmonary embolism.     Irene Kirby MD  May be reached through [address] [place] [address]